# Patient Record
Sex: FEMALE | Race: WHITE | NOT HISPANIC OR LATINO | Employment: OTHER | URBAN - METROPOLITAN AREA
[De-identification: names, ages, dates, MRNs, and addresses within clinical notes are randomized per-mention and may not be internally consistent; named-entity substitution may affect disease eponyms.]

---

## 2023-10-23 ENCOUNTER — HOSPITAL ENCOUNTER (OUTPATIENT)
Dept: CARDIOLOGY | Facility: HOSPITAL | Age: 73
Discharge: HOME | End: 2023-10-23
Payer: MEDICARE

## 2023-10-23 ENCOUNTER — DOCUMENTATION (OUTPATIENT)
Dept: CARDIOLOGY | Facility: CLINIC | Age: 73
End: 2023-10-23
Payer: MEDICARE

## 2023-10-23 DIAGNOSIS — Z95.0 PACEMAKER: ICD-10-CM

## 2023-10-23 DIAGNOSIS — I44.2 ATRIOVENTRICULAR BLOCK, THIRD DEGREE (MULTI): ICD-10-CM

## 2023-10-23 DIAGNOSIS — Z95.0 PACEMAKER: Primary | ICD-10-CM

## 2023-10-24 ENCOUNTER — TELEPHONE (OUTPATIENT)
Dept: CARDIOLOGY | Facility: CLINIC | Age: 73
End: 2023-10-24
Payer: MEDICARE

## 2023-10-24 NOTE — TELEPHONE ENCOUNTER
Left message for patient to call and make a appointment ASAP with Michell due to ABN device check.

## 2023-10-24 NOTE — PROGRESS NOTES
Pacemaker interrogation dated October 23, 2023 reveals atrial fibrillation ongoing since October 21, 2023.  The patient is not presently anticoagulated.  Request forwarded to scheduling to arrange an office visit as soon as possible for discussion regarding anticoagulation therapy and treatment of atrial fibrillation.

## 2023-10-30 ENCOUNTER — HOSPITAL ENCOUNTER (OUTPATIENT)
Dept: CARDIOLOGY | Facility: HOSPITAL | Age: 73
Discharge: HOME | End: 2023-10-30
Payer: MEDICARE

## 2023-10-30 DIAGNOSIS — Z95.0 PACEMAKER: ICD-10-CM

## 2023-10-30 DIAGNOSIS — I44.2 ATRIOVENTRICULAR BLOCK, THIRD DEGREE (MULTI): ICD-10-CM

## 2023-11-10 ENCOUNTER — HOSPITAL ENCOUNTER (OUTPATIENT)
Dept: CARDIOLOGY | Facility: HOSPITAL | Age: 73
Discharge: HOME | End: 2023-11-10
Payer: MEDICARE

## 2023-11-10 ENCOUNTER — OFFICE VISIT (OUTPATIENT)
Dept: CARDIOLOGY | Facility: CLINIC | Age: 73
End: 2023-11-10
Payer: MEDICARE

## 2023-11-10 VITALS
WEIGHT: 169 LBS | HEIGHT: 63 IN | SYSTOLIC BLOOD PRESSURE: 104 MMHG | BODY MASS INDEX: 29.95 KG/M2 | HEART RATE: 71 BPM | DIASTOLIC BLOOD PRESSURE: 62 MMHG

## 2023-11-10 DIAGNOSIS — I49.5 SINUS NODE DYSFUNCTION (MULTI): ICD-10-CM

## 2023-11-10 DIAGNOSIS — R55 NEAR SYNCOPE: ICD-10-CM

## 2023-11-10 DIAGNOSIS — Z95.0 PACEMAKER: ICD-10-CM

## 2023-11-10 DIAGNOSIS — I95.1 ORTHOSTATIC HYPOTENSION: ICD-10-CM

## 2023-11-10 DIAGNOSIS — I44.39 HIGH-GRADE ATRIOVENTRICULAR BLOCK: ICD-10-CM

## 2023-11-10 DIAGNOSIS — I44.2 ATRIOVENTRICULAR BLOCK, THIRD DEGREE (MULTI): ICD-10-CM

## 2023-11-10 DIAGNOSIS — I45.89 CHRONOTROPIC INCOMPETENCE: ICD-10-CM

## 2023-11-10 DIAGNOSIS — I44.2 ATRIOVENTRICULAR BLOCK, COMPLETE (MULTI): ICD-10-CM

## 2023-11-10 DIAGNOSIS — Z71.89 ENCOUNTER FOR MEDICATION REVIEW AND COUNSELING: ICD-10-CM

## 2023-11-10 DIAGNOSIS — I48.0 PAROXYSMAL A-FIB (MULTI): Primary | ICD-10-CM

## 2023-11-10 DIAGNOSIS — Z71.2 ENCOUNTER TO DISCUSS TEST RESULTS: ICD-10-CM

## 2023-11-10 DIAGNOSIS — Z78.9 NEVER SMOKED ANY SUBSTANCE: ICD-10-CM

## 2023-11-10 DIAGNOSIS — E66.3 OVERWEIGHT WITH BODY MASS INDEX (BMI) OF 29 TO 29.9 IN ADULT: ICD-10-CM

## 2023-11-10 PROBLEM — E66.09 OTHER OBESITY DUE TO EXCESS CALORIES: Status: ACTIVE | Noted: 2023-06-14

## 2023-11-10 PROBLEM — I44.30 AV BLOCK: Status: ACTIVE | Noted: 2023-11-10

## 2023-11-10 PROBLEM — M47.812 PRIMARY OSTEOARTHRITIS OF CERVICAL SPINE: Status: ACTIVE | Noted: 2023-06-14

## 2023-11-10 PROBLEM — G47.33 OBSTRUCTIVE SLEEP APNEA (ADULT) (PEDIATRIC): Status: ACTIVE | Noted: 2023-06-14

## 2023-11-10 PROBLEM — N31.8 LOW COMPLIANCE BLADDER: Status: ACTIVE | Noted: 2023-06-14

## 2023-11-10 PROBLEM — F22: Status: ACTIVE | Noted: 2023-06-14

## 2023-11-10 PROBLEM — E13.319: Status: ACTIVE | Noted: 2023-06-14

## 2023-11-10 PROBLEM — R19.4 CHANGE IN BOWEL HABITS: Status: ACTIVE | Noted: 2023-06-14

## 2023-11-10 PROBLEM — I10 HYPERTENSIVE DISORDER: Status: ACTIVE | Noted: 2023-11-10

## 2023-11-10 PROBLEM — E78.5 HYPERLIPIDEMIA: Status: ACTIVE | Noted: 2023-06-14

## 2023-11-10 PROBLEM — R13.10 DYSPHAGIA: Status: ACTIVE | Noted: 2023-06-14

## 2023-11-10 PROBLEM — D50.8 IRON DEFICIENCY ANEMIA SECONDARY TO INADEQUATE DIETARY IRON INTAKE: Status: ACTIVE | Noted: 2023-06-14

## 2023-11-10 PROBLEM — I87.2 VENOUS INSUFFICIENCY OF BOTH LOWER EXTREMITIES: Status: ACTIVE | Noted: 2023-06-14

## 2023-11-10 PROBLEM — F43.10 PTSD (POST-TRAUMATIC STRESS DISORDER): Status: ACTIVE | Noted: 2023-06-14

## 2023-11-10 PROBLEM — Z99.89 DEPENDENCE ON OTHER ENABLING MACHINES AND DEVICES: Status: ACTIVE | Noted: 2023-06-14

## 2023-11-10 PROBLEM — M79.7 FIBROMYALGIA MUSCLE PAIN: Status: ACTIVE | Noted: 2023-06-14

## 2023-11-10 PROBLEM — M81.0 AGE-RELATED OSTEOPOROSIS WITHOUT CURRENT PATHOLOGICAL FRACTURE: Status: ACTIVE | Noted: 2023-06-14

## 2023-11-10 PROBLEM — R07.9 CHEST PAIN: Status: ACTIVE | Noted: 2023-11-10

## 2023-11-10 PROBLEM — M17.0 LOCALIZED OSTEOARTHRITIS OF KNEES, BILATERAL: Status: ACTIVE | Noted: 2023-06-14

## 2023-11-10 PROBLEM — E77.8 HYPOPROTEINEMIA (MULTI): Status: ACTIVE | Noted: 2023-06-14

## 2023-11-10 PROBLEM — N39.46 MIXED STRESS AND URGE URINARY INCONTINENCE: Status: ACTIVE | Noted: 2023-06-14

## 2023-11-10 PROBLEM — M50.90 CERVICAL DISC DISEASE: Status: ACTIVE | Noted: 2023-06-14

## 2023-11-10 PROBLEM — M81.0 OSTEOPOROSIS: Status: ACTIVE | Noted: 2023-06-14

## 2023-11-10 PROBLEM — R06.00 DYSPNEA: Status: ACTIVE | Noted: 2023-11-10

## 2023-11-10 PROBLEM — R42 DIZZINESS: Status: ACTIVE | Noted: 2023-11-10

## 2023-11-10 PROBLEM — I13.0 HYPERTENSIVE HEART AND CHRONIC KIDNEY DISEASE WITH HEART FAILURE AND STAGE 1 THROUGH STAGE 4 CHRONIC KIDNEY DISEASE, OR UNSPECIFIED CHRONIC KIDNEY DISEASE (MULTI): Status: ACTIVE | Noted: 2023-06-14

## 2023-11-10 PROBLEM — E55.9 VITAMIN D DEFICIENCY: Status: ACTIVE | Noted: 2023-06-14

## 2023-11-10 PROBLEM — I50.30 DIASTOLIC HEART FAILURE (MULTI): Status: ACTIVE | Noted: 2023-06-14

## 2023-11-10 PROBLEM — K58.1 IRRITABLE BOWEL SYNDROME WITH CONSTIPATION: Status: ACTIVE | Noted: 2023-06-14

## 2023-11-10 PROBLEM — E11.9 TYPE 2 DIABETES MELLITUS (MULTI): Status: ACTIVE | Noted: 2018-06-15

## 2023-11-10 PROBLEM — N20.0 RENAL CALCULI: Status: ACTIVE | Noted: 2023-06-14

## 2023-11-10 PROBLEM — M17.11 LOCALIZED PRIMARY OSTEOARTHRITIS OF RIGHT LOWER LEG: Status: ACTIVE | Noted: 2023-06-14

## 2023-11-10 PROBLEM — R89.9 ABNORMAL LABORATORY TEST RESULT: Status: ACTIVE | Noted: 2023-06-14

## 2023-11-10 PROBLEM — G43.009 MIGRAINE WITHOUT AURA: Status: ACTIVE | Noted: 2023-06-14

## 2023-11-10 PROBLEM — F32.A DEPRESSION: Status: ACTIVE | Noted: 2023-11-10

## 2023-11-10 PROBLEM — M17.9 OSTEOARTHRITIS OF KNEE: Status: ACTIVE | Noted: 2018-06-15

## 2023-11-10 PROBLEM — I10 BENIGN ESSENTIAL HYPERTENSION: Status: ACTIVE | Noted: 2023-06-14

## 2023-11-10 PROBLEM — N18.31 STAGE 3A CHRONIC KIDNEY DISEASE (MULTI): Status: ACTIVE | Noted: 2023-06-14

## 2023-11-10 PROBLEM — R09.02 HYPOXIA: Status: ACTIVE | Noted: 2023-06-14

## 2023-11-10 PROBLEM — N32.81 OAB (OVERACTIVE BLADDER): Status: ACTIVE | Noted: 2023-06-14

## 2023-11-10 PROBLEM — H81.10 BPPV (BENIGN PAROXYSMAL POSITIONAL VERTIGO): Status: ACTIVE | Noted: 2023-06-14

## 2023-11-10 PROBLEM — R91.1 LUNG NODULE: Status: ACTIVE | Noted: 2023-06-14

## 2023-11-10 PROCEDURE — 93290 INTERROG DEV EVAL ICPMS IP: CPT

## 2023-11-10 PROCEDURE — 93290 INTERROG DEV EVAL ICPMS IP: CPT | Performed by: INTERNAL MEDICINE

## 2023-11-10 PROCEDURE — 93000 ELECTROCARDIOGRAM COMPLETE: CPT | Performed by: INTERNAL MEDICINE

## 2023-11-10 PROCEDURE — 1159F MED LIST DOCD IN RCRD: CPT | Performed by: INTERNAL MEDICINE

## 2023-11-10 PROCEDURE — 93280 PM DEVICE PROGR EVAL DUAL: CPT | Performed by: INTERNAL MEDICINE

## 2023-11-10 PROCEDURE — 3074F SYST BP LT 130 MM HG: CPT | Performed by: INTERNAL MEDICINE

## 2023-11-10 PROCEDURE — 3008F BODY MASS INDEX DOCD: CPT | Performed by: INTERNAL MEDICINE

## 2023-11-10 PROCEDURE — 3078F DIAST BP <80 MM HG: CPT | Performed by: INTERNAL MEDICINE

## 2023-11-10 PROCEDURE — 99215 OFFICE O/P EST HI 40 MIN: CPT | Performed by: INTERNAL MEDICINE

## 2023-11-10 RX ORDER — OXYBUTYNIN CHLORIDE 5 MG/1
5 TABLET ORAL 2 TIMES DAILY
COMMUNITY
End: 2024-05-09 | Stop reason: WASHOUT

## 2023-11-10 RX ORDER — MECLIZINE HYDROCHLORIDE 25 MG/1
25 TABLET ORAL EVERY 6 HOURS PRN
COMMUNITY
End: 2023-11-10 | Stop reason: ALTCHOICE

## 2023-11-10 RX ORDER — TRIAMCINOLONE ACETONIDE 1 MG/G
1 CREAM TOPICAL AS NEEDED
COMMUNITY
End: 2023-11-10 | Stop reason: ALTCHOICE

## 2023-11-10 RX ORDER — GLUCOSAMINE/CHONDRO SU A 500-400 MG
1 TABLET ORAL
COMMUNITY
End: 2023-11-10 | Stop reason: ALTCHOICE

## 2023-11-10 RX ORDER — POTASSIUM CHLORIDE 750 MG/1
10 TABLET, FILM COATED, EXTENDED RELEASE ORAL 2 TIMES DAILY
COMMUNITY

## 2023-11-10 RX ORDER — RISPERIDONE 0.5 MG/1
1 TABLET ORAL NIGHTLY
COMMUNITY
Start: 2023-09-25

## 2023-11-10 RX ORDER — HYDROGEN PEROXIDE 3 %
20 SOLUTION, NON-ORAL MISCELLANEOUS DAILY
COMMUNITY
End: 2024-05-10 | Stop reason: WASHOUT

## 2023-11-10 RX ORDER — ALBUTEROL SULFATE 0.83 MG/ML
2.5 SOLUTION RESPIRATORY (INHALATION) EVERY 4 HOURS PRN
COMMUNITY
End: 2023-11-10 | Stop reason: ALTCHOICE

## 2023-11-10 RX ORDER — MIRTAZAPINE 15 MG/1
15 TABLET, FILM COATED ORAL NIGHTLY
COMMUNITY
End: 2023-11-10 | Stop reason: ALTCHOICE

## 2023-11-10 RX ORDER — TRAMADOL HYDROCHLORIDE 50 MG/1
50 TABLET ORAL EVERY 6 HOURS PRN
COMMUNITY
Start: 2023-02-08 | End: 2023-11-10 | Stop reason: ALTCHOICE

## 2023-11-10 RX ORDER — FLUOCINONIDE TOPICAL SOLUTION USP, 0.05% 0.5 MG/ML
SOLUTION TOPICAL
COMMUNITY
End: 2023-11-10 | Stop reason: ALTCHOICE

## 2023-11-10 RX ORDER — POTASSIUM CHLORIDE 750 MG/1
10 TABLET, FILM COATED, EXTENDED RELEASE ORAL 2 TIMES DAILY
COMMUNITY
Start: 2023-06-14 | End: 2023-11-10 | Stop reason: ALTCHOICE

## 2023-11-10 RX ORDER — INSULIN LISPRO 100 [IU]/ML
6 INJECTION, SOLUTION INTRAVENOUS; SUBCUTANEOUS
COMMUNITY
End: 2023-11-10 | Stop reason: ALTCHOICE

## 2023-11-10 RX ORDER — INSULIN GLARGINE 100 [IU]/ML
22 INJECTION, SOLUTION SUBCUTANEOUS EVERY MORNING
COMMUNITY
Start: 2023-10-05

## 2023-11-10 RX ORDER — ATORVASTATIN CALCIUM 20 MG/1
20 TABLET, FILM COATED ORAL NIGHTLY
COMMUNITY
Start: 2022-05-31

## 2023-11-10 RX ORDER — PREDNISONE 20 MG/1
TABLET ORAL
COMMUNITY
End: 2024-05-09 | Stop reason: WASHOUT

## 2023-11-10 RX ORDER — AMOXICILLIN 250 MG
1 CAPSULE ORAL NIGHTLY
COMMUNITY
End: 2023-11-10 | Stop reason: ALTCHOICE

## 2023-11-10 RX ORDER — PEN NEEDLE, DIABETIC 29 G X1/2"
NEEDLE, DISPOSABLE MISCELLANEOUS EVERY 8 HOURS
COMMUNITY

## 2023-11-10 RX ORDER — CITALOPRAM 40 MG/1
40 TABLET, FILM COATED ORAL DAILY
COMMUNITY
End: 2023-11-10 | Stop reason: ALTCHOICE

## 2023-11-10 RX ORDER — SEMAGLUTIDE 1.34 MG/ML
0.25 INJECTION, SOLUTION SUBCUTANEOUS
COMMUNITY
Start: 2023-08-22 | End: 2023-11-10 | Stop reason: ALTCHOICE

## 2023-11-10 RX ORDER — POTASSIUM CHLORIDE 20 MEQ/1
20 TABLET, EXTENDED RELEASE ORAL 2 TIMES DAILY
COMMUNITY
Start: 2023-06-13 | End: 2023-11-10 | Stop reason: ALTCHOICE

## 2023-11-10 RX ORDER — ASPIRIN 81 MG/1
81 TABLET ORAL EVERY OTHER DAY
COMMUNITY
End: 2024-05-10 | Stop reason: WASHOUT

## 2023-11-10 RX ORDER — ALPRAZOLAM 0.25 MG/1
TABLET ORAL
COMMUNITY
End: 2023-11-10 | Stop reason: ALTCHOICE

## 2023-11-10 RX ORDER — KETOCONAZOLE 20 MG/ML
SHAMPOO, SUSPENSION TOPICAL
COMMUNITY
End: 2023-11-10 | Stop reason: ALTCHOICE

## 2023-11-10 RX ORDER — HYDROGEN PEROXIDE 3 %
20 SOLUTION, NON-ORAL MISCELLANEOUS 2 TIMES DAILY
COMMUNITY
End: 2023-11-10 | Stop reason: ALTCHOICE

## 2023-11-10 RX ORDER — SULFAMETHOXAZOLE AND TRIMETHOPRIM 800; 160 MG/1; MG/1
1 TABLET ORAL EVERY 12 HOURS
COMMUNITY
End: 2023-11-10 | Stop reason: ALTCHOICE

## 2023-11-10 RX ORDER — SOLIFENACIN SUCCINATE 10 MG/1
1 TABLET, FILM COATED ORAL DAILY
COMMUNITY
Start: 2023-07-25 | End: 2023-11-10 | Stop reason: ALTCHOICE

## 2023-11-10 RX ORDER — ORAL SEMAGLUTIDE 7 MG/1
1 TABLET ORAL DAILY
COMMUNITY
End: 2023-11-10 | Stop reason: ALTCHOICE

## 2023-11-10 RX ORDER — PEN NEEDLE, DIABETIC 30 GX3/16"
1 NEEDLE, DISPOSABLE MISCELLANEOUS 4 TIMES DAILY PRN
COMMUNITY
Start: 2023-05-22

## 2023-11-10 RX ORDER — RISPERIDONE 1 MG/1
1 TABLET ORAL
COMMUNITY
End: 2023-11-10 | Stop reason: ALTCHOICE

## 2023-11-10 RX ORDER — METOPROLOL TARTRATE 50 MG/1
50 TABLET ORAL 2 TIMES DAILY
COMMUNITY
End: 2023-11-10 | Stop reason: ALTCHOICE

## 2023-11-10 RX ORDER — CALCIUM CARBONATE/VITAMIN D3 500 MG-10
1 TABLET,CHEWABLE ORAL
COMMUNITY
End: 2023-11-10 | Stop reason: ALTCHOICE

## 2023-11-10 RX ORDER — AMLODIPINE BESYLATE 5 MG/1
5 TABLET ORAL DAILY
COMMUNITY
End: 2023-11-10 | Stop reason: ALTCHOICE

## 2023-11-10 RX ORDER — POLYETHYLENE GLYCOL 3350 17 G/17G
17 POWDER, FOR SOLUTION ORAL
COMMUNITY
End: 2023-11-10 | Stop reason: ALTCHOICE

## 2023-11-10 RX ORDER — OXYCODONE AND ACETAMINOPHEN 5; 325 MG/1; MG/1
1 TABLET ORAL EVERY 6 HOURS
COMMUNITY
End: 2023-11-10 | Stop reason: ALTCHOICE

## 2023-11-10 RX ORDER — HYDROXYZINE HYDROCHLORIDE 25 MG/1
TABLET, FILM COATED ORAL
COMMUNITY
End: 2023-11-10 | Stop reason: ALTCHOICE

## 2023-11-10 RX ORDER — FLUOCINOLONE ACETONIDE 0.1 MG/G
1 CREAM TOPICAL 2 TIMES DAILY
COMMUNITY
End: 2023-11-10 | Stop reason: ALTCHOICE

## 2023-11-10 RX ORDER — PRAZOSIN HYDROCHLORIDE 1 MG/1
1 CAPSULE ORAL NIGHTLY
COMMUNITY
End: 2024-05-10 | Stop reason: WASHOUT

## 2023-11-10 RX ORDER — FLUTICASONE PROPIONATE AND SALMETEROL 100; 50 UG/1; UG/1
1 POWDER RESPIRATORY (INHALATION) 2 TIMES DAILY
COMMUNITY
Start: 2023-08-03 | End: 2023-11-10 | Stop reason: ALTCHOICE

## 2023-11-10 RX ORDER — ACETAMINOPHEN 500 MG
2000 TABLET ORAL
COMMUNITY
End: 2023-11-10 | Stop reason: ALTCHOICE

## 2023-11-10 RX ORDER — FLUTICASONE FUROATE AND VILANTEROL 100; 25 UG/1; UG/1
1 POWDER RESPIRATORY (INHALATION) DAILY
COMMUNITY
End: 2023-11-10 | Stop reason: ALTCHOICE

## 2023-11-10 RX ORDER — FUROSEMIDE 20 MG/1
20 TABLET ORAL DAILY PRN
COMMUNITY

## 2023-11-10 RX ORDER — ORAL SEMAGLUTIDE 14 MG/1
14 TABLET ORAL DAILY
COMMUNITY

## 2023-11-10 RX ORDER — HYDROCODONE BITARTRATE AND ACETAMINOPHEN 5; 325 MG/1; MG/1
1 TABLET ORAL AS NEEDED
COMMUNITY
Start: 2018-10-19 | End: 2023-11-10 | Stop reason: ALTCHOICE

## 2023-11-10 RX ORDER — CYCLOBENZAPRINE HCL 5 MG
10 TABLET ORAL 2 TIMES DAILY
COMMUNITY
End: 2023-11-10 | Stop reason: ALTCHOICE

## 2023-11-10 RX ORDER — AMLODIPINE BESYLATE 2.5 MG/1
2.5 TABLET ORAL EVERY MORNING
Qty: 90 TABLET | Refills: 3 | Status: SHIPPED | OUTPATIENT
Start: 2023-11-10

## 2023-11-10 RX ORDER — GABAPENTIN 100 MG/1
100 CAPSULE ORAL 3 TIMES DAILY
COMMUNITY
End: 2023-11-10 | Stop reason: ALTCHOICE

## 2023-11-10 RX ORDER — INSULIN GLARGINE 100 [IU]/ML
30 INJECTION, SOLUTION SUBCUTANEOUS NIGHTLY
COMMUNITY
End: 2023-11-10 | Stop reason: ALTCHOICE

## 2023-11-10 RX ORDER — FUROSEMIDE 40 MG/1
40 TABLET ORAL DAILY PRN
COMMUNITY
End: 2023-11-10 | Stop reason: ALTCHOICE

## 2023-11-10 RX ORDER — ACETAMINOPHEN 500 MG
1 TABLET ORAL DAILY
COMMUNITY
End: 2023-11-10 | Stop reason: ALTCHOICE

## 2023-11-10 RX ORDER — LANOLIN ALCOHOL/MO/W.PET/CERES
1 CREAM (GRAM) TOPICAL DAILY
COMMUNITY

## 2023-11-10 RX ORDER — INSULIN LISPRO 100 [IU]/ML
10 INJECTION, SOLUTION INTRAVENOUS; SUBCUTANEOUS SEE ADMIN INSTRUCTIONS
COMMUNITY

## 2023-11-10 RX ORDER — DENOSUMAB 60 MG/ML
60 INJECTION SUBCUTANEOUS
COMMUNITY
End: 2023-11-10 | Stop reason: ALTCHOICE

## 2023-11-10 RX ORDER — CITALOPRAM 20 MG/1
20 TABLET, FILM COATED ORAL EVERY MORNING
COMMUNITY
Start: 2023-10-06

## 2023-11-10 RX ORDER — TAMSULOSIN HYDROCHLORIDE 0.4 MG/1
CAPSULE ORAL
COMMUNITY
End: 2023-11-10 | Stop reason: ALTCHOICE

## 2023-11-10 RX ORDER — ACETAMINOPHEN 500 MG
1000 TABLET ORAL EVERY 6 HOURS PRN
COMMUNITY
End: 2023-11-10 | Stop reason: ALTCHOICE

## 2023-11-10 RX ORDER — ALBUTEROL SULFATE 90 UG/1
1 AEROSOL, METERED RESPIRATORY (INHALATION) EVERY 4 HOURS PRN
COMMUNITY
Start: 2023-07-13

## 2023-11-10 RX ORDER — AMLODIPINE BESYLATE 2.5 MG/1
2.5 TABLET ORAL EVERY MORNING
COMMUNITY
Start: 2023-05-09 | End: 2023-11-10 | Stop reason: SDUPTHER

## 2023-11-10 RX ORDER — CEPHALEXIN 500 MG/1
1000 CAPSULE ORAL 2 TIMES DAILY
COMMUNITY
End: 2023-11-10 | Stop reason: ALTCHOICE

## 2023-11-10 NOTE — PROGRESS NOTES
"Chief Complaint:   sinus node dysfunction     History Of Present Illness:    Twyla Waller is a 73 y.o. female presenting with follow up.    She denies any arrhythmia symptoms.  She denies any lightheadedness, near-syncope or syncope.      She denies any palpitation . She did not know she had any atrial fibrillation.     Last Recorded Vitals:  Vitals:    11/10/23 1258   BP: 104/62   Pulse: 71   Weight: 76.7 kg (169 lb)   Height: 1.6 m (5' 3\")       Past Medical History:  She has no past medical history on file.    Past Surgical History:  She has no past surgical history on file.      Social History:  She reports that she has never smoked. She has never used smokeless tobacco. She reports that she does not currently use alcohol. She reports that she does not currently use drugs.    Family History:  No family history on file.     Allergies:  Morphine, Calcium channel blocking agents-dihydropyridines, Latex, Penicillins, Ace inhibitors, Alendronate, Calcium channel blocking agent diltiazem analogues, Ibuprofen, Lisinopril, Metformin, Rybelsus [semaglutide], Sertraline, and Iodine    Outpatient Medications:  Current Outpatient Medications   Medication Instructions    albuterol 90 mcg/actuation inhaler 1 puff, inhalation, Every 4 hours PRN    amLODIPine (NORVASC) 2.5 mg, oral, Every morning    aspirin 81 mg, oral, Every morning    atorvastatin (LIPITOR) 20 mg, oral, Nightly    citalopram (CELEXA) 20 mg, oral, Every morning    cyanocobalamin (Vitamin B-12) 1,000 mcg tablet 1 tablet, oral, Daily    esomeprazole (NEXIUM) 20 mg, oral, Daily    furosemide (LASIX) 20 mg, oral, Every morning    insulin lispro (HumaLOG) 100 unit/mL injection INJECT 8 UNITS WITH BREAKFAST, 6 UNITS WITH LUNCH - PLUS CORRECTION 1:30>150MG/DL (MAX DAILY AMOUNT OF 40 UNITS) for 112<BR>    Lantus Solostar U-100 Insulin 16 Units, subcutaneous, Nightly    oxybutynin (DITROPAN) 5 mg, oral, 2 times daily    pen needle 1/2\" 29G X 12mm needle Every 8 hours " "   pen needle, diabetic (BD Ultra-Fine Short Pen Needle) 31 gauge x 5/16\" needle 1 each, subcutaneous, 4 times daily PRN    potassium chloride CR 10 mEq ER tablet 20 mEq, oral, 2 times daily    prazosin (MINIPRESS) 1 mg, oral, Nightly    predniSONE (Deltasone) 20 mg tablet     risperiDONE (RisperDAL) 0.5 mg tablet 1 tablet, oral, Nightly    Rybelsus 14 mg, oral, Daily, 30 MINUTES BEFORE FIRST FOOD, BEVERAGE, OR OTHER MEDICATION OF THE DAY<BR>   ROS  Constitutional: not feeling tired.   Eyes: no eyesight problems.   ENT: no hearing loss and no nosebleeds.   Cardiovascular: no intermittent leg claudication and as noted in HPI.   Respiratory: no chronic cough and no shortness of breath.   Gastrointestinal: no change in bowel habits and no blood in stools.   Genitourinary: no urinary frequency.   Skin: no skin rashes.   Neurological: no seizures and no frequent falls.   Psychiatric: no depression and not suicidal.   All other systems have been reviewed and are negative for complaint.   Physical Exam:  Constitutional: alert and in no acute distress.   Neck: neck is supple, symmetric, trachea midline, no masses  and no thyromegaly .   Pulmonary: no increased work of breathing or signs of respiratory distress  and lungs clear to auscultation.    Cardiovascular: carotid pulses 2+ bilaterally with no bruit , JVP was normal, no thrills , regular rhythm, normal S1 and S2, no murmurs  occasional irregular beat., pedal pulses 2+ bilaterally  and no edema  bilateral ankle 1+ pitting edema.   Abdomen: abdomen non-tender, no masses  and no hepatomegaly .   Skin: skin warm and dry, normal skin turgor .   Psychiatric judgment and insight is normal  and oriented to person, place and time .      Last Labs:  CBC -  Lab Results   Component Value Date    WBC 10.0 02/08/2023    HGB 13.5 02/08/2023    HCT 41.0 02/08/2023    MCV 96 02/08/2023     02/08/2023       CMP -  Lab Results   Component Value Date    CALCIUM 9.8 02/08/2023 " "      LIPID PANEL -   No results found for: \"CHOL\", \"TRIG\", \"HDL\", \"CHHDL\", \"LDLF\", \"VLDL\", \"NHDL\"    RENAL FUNCTION PANEL -   Lab Results   Component Value Date    GLUCOSE 152 (H) 02/08/2023     02/08/2023    K 3.2 (L) 02/08/2023     02/08/2023    CO2 31 02/08/2023    ANIONGAP 12 02/08/2023    BUN 31 (H) 02/08/2023    CREATININE 1.12 (H) 02/08/2023    CALCIUM 9.8 02/08/2023        No results found for: \"BNP\", \"HGBA1C\"    Last Cardiology Tests:        48 hour monitor Nov 2022. Sinus pause 3 seconds. Intermittent high grade heart block.  Echo LVEF 65% Jan . 2020    ECG:  Today.    Atrial fibrillation. Appropriate pacing.  Left axis deviation.  Corrected QT interval 490 ms  ECG May 2023 Appropriate pacing and sensing.      Lab review: I have personally reviewed the laboratory result(s) see above    Assessment/Plan   Diagnoses and all orders for this visit:  Pacemaker  Sinus node dysfunction (CMS/HCC)  High-grade atrioventricular block  Chronotropic incompetence  Orthostatic hypotension  Near syncope  Encounter for medication review and counseling  Encounter to discuss test results  Overweight with body mass index (BMI) of 29 to 29.9 in adult  Never smoked any substance      Syncope and intermittent high grade AV block and sinus node dysfunction with pauses of 3 seconds. Intermittent complete heart block. Status post dual-chamber pacemaker March 2023  Sinus node dysfunction.  Chronotropic incompetence.  Walk test performed in past.  Adjusted sensor.  [employer] PM 2272 pacemaker. Reviewed device check with patient and . All questions answered. Ordered device checks.  Dizziness and chronotropic incompetence.  Improved after adjustment of sensor.    Paroxysmal atrial fibrillation.  Reviewed etiologies of atrial fibrillation.  Associated medical conditions discussed. Treatment options reviewed. Discussed anticoagulation vs no anticoagulation. Start Eliquis. Prescription sent.   Orthostatic hypotension. " Improved with changing med. Behavioral modification reenforced  Overweight  AHA recommendations for exercise, diet, and behavioral modification reviewed with pt.    The patient and I and  discussed the mechanism of arrhythmia, pacemaker, pacemaker function, pacemaker model, heart rate, walk test, indications for and types of medications, discussion if and what medication refills needed, treatment options, risks, benefits, and imponderables. American Heart Association lifestyle changes and behavioral modification discussed. All questions answered in detail. Counseling over 50% visit regarding above. Patient appreciative of care.         Depression screen    Normal depression screening noted and reviewed with patient. Referred to primary physician for follow- up.          Grammar    Please excuse grammatical or dictation errors as software dictation application being used.        Ofelia Camargo MD

## 2023-11-10 NOTE — PATIENT INSTRUCTIONS
START ELIQUIS 5 MG TWICE DAILY    FOLLOW UP IN 6 MONTHS WITH FORREST WITH A DEVICE CHECK  REMOTE CHECKS IN 3 AND 9 MONTHS

## 2023-11-13 ENCOUNTER — HOSPITAL ENCOUNTER (OUTPATIENT)
Dept: CARDIOLOGY | Facility: HOSPITAL | Age: 73
Discharge: HOME | End: 2023-11-13
Payer: MEDICARE

## 2023-11-13 DIAGNOSIS — I44.2 ATRIOVENTRICULAR BLOCK, THIRD DEGREE (MULTI): ICD-10-CM

## 2023-11-13 DIAGNOSIS — Z95.0 PACEMAKER: ICD-10-CM

## 2024-02-13 ENCOUNTER — HOSPITAL ENCOUNTER (OUTPATIENT)
Dept: CARDIOLOGY | Facility: HOSPITAL | Age: 74
Discharge: HOME | End: 2024-02-13
Payer: MEDICARE

## 2024-02-13 DIAGNOSIS — Z95.0 PACEMAKER: ICD-10-CM

## 2024-02-13 DIAGNOSIS — I44.2 ATRIOVENTRICULAR BLOCK, THIRD DEGREE (MULTI): ICD-10-CM

## 2024-02-13 PROCEDURE — 93294 REM INTERROG EVL PM/LDLS PM: CPT | Performed by: INTERNAL MEDICINE

## 2024-02-13 PROCEDURE — 93296 REM INTERROG EVL PM/IDS: CPT

## 2024-03-27 ENCOUNTER — TELEPHONE (OUTPATIENT)
Dept: CARDIOLOGY | Facility: CLINIC | Age: 74
End: 2024-03-27
Payer: MEDICARE

## 2024-03-27 DIAGNOSIS — I48.0 PAROXYSMAL A-FIB (MULTI): Primary | ICD-10-CM

## 2024-03-27 NOTE — TELEPHONE ENCOUNTER
Pt left message stating that she is scheduled for dental work next week.  Pt is asking to speak with someone from Dr. Camargo's office.    Attempted to contact pt, left message for pt to return call to office.    Routed to Indigo SOLIS RN

## 2024-03-28 NOTE — TELEPHONE ENCOUNTER
Pt left message that she needs direction on holding Eliquis prior to dental work.      Return call placed to pt.  Pt is having a dental cleaning, filling and cap to be done.  Pt needs to hold Eliquis and aspirin prior to dental work on Wednesday 4/3/2024.    Pt states that the dentist is Florecita plunkett in [facility].  Phone is [phone].      Routed to Starr ROSALES LPN

## 2024-03-29 RX ORDER — ENOXAPARIN SODIUM 100 MG/ML
INJECTION SUBCUTANEOUS
Qty: 3 EACH | Refills: 0 | Status: SHIPPED | OUTPATIENT
Start: 2024-03-29 | End: 2024-04-03 | Stop reason: WASHOUT

## 2024-03-29 NOTE — TELEPHONE ENCOUNTER
I called patient and advised. I instructed her to start holding Aspirin now and hold Eliquis for 3 days prior. She verbalized understanding. Will send Lovenox to [facility] in [facility] per patient request.

## 2024-04-01 NOTE — TELEPHONE ENCOUNTER
Starr  from [facility] left message asking for return call to discuss pt's upcoming dental appointments.  Per Starr pt will be having 2 apt's per week for the next month.  Starr states that pt was advised by [facility] that she would need injections.  Starr is asking how pt will do the injections with the appointments.  Starr can be reached at [phone] option 2      Routed to Indigo SOLIS RN

## 2024-04-02 NOTE — TELEPHONE ENCOUNTER
Pt left message in regards to this as well.  Please call pt with final recommendations.    Routed to Indigo SOLIS RN

## 2024-04-03 RX ORDER — ENOXAPARIN SODIUM 100 MG/ML
100 INJECTION SUBCUTANEOUS DAILY
Qty: 30 EACH | Refills: 0 | Status: SHIPPED | OUTPATIENT
Start: 2024-04-03 | End: 2024-05-09 | Stop reason: WASHOUT

## 2024-04-03 NOTE — TELEPHONE ENCOUNTER
Per Michell CNP, we will prescribe Lovenox injections once daily while patient is holding Eliquis for multiple procedures that are happening throughout the month. Patient will hold the Lovenox the day before any scheduled procedures and resume once it is Ok'd by provider performing the procedures. Patient will resume Eliquis when all procedures are completed. Order placed and sent to provider for signature.

## 2024-04-08 NOTE — TELEPHONE ENCOUNTER
Lesly from [facility] [phone] left message stating that they need to verify dosage of Lovenox pt is to be taking.  Per Lesly, pt does not understand what they are suppose to do with holding Eliquis and taking the Lovenox.  Per Lesly they can help with the instructions if they know what days pt is having procedures done.    Lesly can be reached at [phone] listen until the end then hit 0 and then 0 again.    Routed to Indigo SOLIS RN

## 2024-05-09 ENCOUNTER — HOSPITAL ENCOUNTER (OUTPATIENT)
Dept: CARDIOLOGY | Facility: HOSPITAL | Age: 74
Discharge: HOME | End: 2024-05-09
Payer: MEDICARE

## 2024-05-09 ENCOUNTER — OFFICE VISIT (OUTPATIENT)
Dept: CARDIOLOGY | Facility: CLINIC | Age: 74
End: 2024-05-09
Payer: MEDICARE

## 2024-05-09 VITALS
HEART RATE: 73 BPM | WEIGHT: 163 LBS | SYSTOLIC BLOOD PRESSURE: 118 MMHG | DIASTOLIC BLOOD PRESSURE: 72 MMHG | HEIGHT: 63 IN | BODY MASS INDEX: 28.88 KG/M2

## 2024-05-09 DIAGNOSIS — I87.2 VENOUS INSUFFICIENCY OF BOTH LOWER EXTREMITIES: ICD-10-CM

## 2024-05-09 DIAGNOSIS — I13.0 HYPERTENSIVE HEART AND CHRONIC KIDNEY DISEASE WITH HEART FAILURE AND STAGE 1 THROUGH STAGE 4 CHRONIC KIDNEY DISEASE, OR UNSPECIFIED CHRONIC KIDNEY DISEASE (MULTI): ICD-10-CM

## 2024-05-09 DIAGNOSIS — I44.39 HIGH-GRADE ATRIOVENTRICULAR BLOCK: ICD-10-CM

## 2024-05-09 DIAGNOSIS — I45.89 CHRONOTROPIC INCOMPETENCE: ICD-10-CM

## 2024-05-09 DIAGNOSIS — E78.2 MIXED HYPERLIPIDEMIA: ICD-10-CM

## 2024-05-09 DIAGNOSIS — I10 BENIGN ESSENTIAL HYPERTENSION: Primary | ICD-10-CM

## 2024-05-09 DIAGNOSIS — I50.32 CHRONIC DIASTOLIC HEART FAILURE (MULTI): ICD-10-CM

## 2024-05-09 DIAGNOSIS — R07.9 CHEST PAIN, UNSPECIFIED TYPE: ICD-10-CM

## 2024-05-09 DIAGNOSIS — I95.1 ORTHOSTATIC HYPOTENSION: ICD-10-CM

## 2024-05-09 DIAGNOSIS — I44.30 AV BLOCK: ICD-10-CM

## 2024-05-09 DIAGNOSIS — I49.5 SINUS NODE DYSFUNCTION (MULTI): ICD-10-CM

## 2024-05-09 DIAGNOSIS — Z95.0 PACEMAKER: ICD-10-CM

## 2024-05-09 PROCEDURE — 1159F MED LIST DOCD IN RCRD: CPT | Performed by: NURSE PRACTITIONER

## 2024-05-09 PROCEDURE — 93280 PM DEVICE PROGR EVAL DUAL: CPT

## 2024-05-09 PROCEDURE — 93280 PM DEVICE PROGR EVAL DUAL: CPT | Performed by: INTERNAL MEDICINE

## 2024-05-09 PROCEDURE — 3078F DIAST BP <80 MM HG: CPT | Performed by: NURSE PRACTITIONER

## 2024-05-09 PROCEDURE — 99215 OFFICE O/P EST HI 40 MIN: CPT | Performed by: NURSE PRACTITIONER

## 2024-05-09 PROCEDURE — 1160F RVW MEDS BY RX/DR IN RCRD: CPT | Performed by: NURSE PRACTITIONER

## 2024-05-09 PROCEDURE — 1036F TOBACCO NON-USER: CPT | Performed by: NURSE PRACTITIONER

## 2024-05-09 PROCEDURE — 3074F SYST BP LT 130 MM HG: CPT | Performed by: NURSE PRACTITIONER

## 2024-05-09 RX ORDER — SOLIFENACIN SUCCINATE 10 MG/1
10 TABLET, FILM COATED ORAL DAILY
COMMUNITY
Start: 2024-02-12 | End: 2024-05-10 | Stop reason: WASHOUT

## 2024-05-09 RX ORDER — AMOXICILLIN 250 MG
1 CAPSULE ORAL DAILY
COMMUNITY
Start: 2020-01-28

## 2024-05-09 RX ORDER — ALPRAZOLAM 0.25 MG/1
TABLET ORAL
COMMUNITY
End: 2024-05-10 | Stop reason: WASHOUT

## 2024-05-09 RX ORDER — ACETAMINOPHEN 500 MG
2000 TABLET ORAL
COMMUNITY

## 2024-05-09 RX ORDER — FLUOCINOLONE ACETONIDE 0.1 MG/G
CREAM TOPICAL 2 TIMES DAILY
COMMUNITY

## 2024-05-09 RX ORDER — METOPROLOL TARTRATE 50 MG/1
50 TABLET ORAL 2 TIMES DAILY
COMMUNITY
End: 2024-05-10 | Stop reason: WASHOUT

## 2024-05-09 RX ORDER — DENOSUMAB 60 MG/ML
60 INJECTION SUBCUTANEOUS
COMMUNITY

## 2024-05-09 RX ORDER — MIRTAZAPINE 15 MG/1
15 TABLET, FILM COATED ORAL NIGHTLY
COMMUNITY

## 2024-05-09 RX ORDER — FLUTICASONE PROPIONATE AND SALMETEROL 100; 50 UG/1; UG/1
1 POWDER RESPIRATORY (INHALATION)
COMMUNITY

## 2024-05-09 NOTE — H&P (VIEW-ONLY)
"CARDIOLOGY OFFICE VISIT      CHIEF COMPLAINT  Chief Complaint   Patient presents with    Device Check   Chief complaint: \"I do not understand why I am feeling so bad.\"    HISTORY OF PRESENT ILLNESS  HPI  History: The patient is a 74-year-old  female who is followed for syncope secondary to sinus node dysfunction and transient high degree AV block status post dual-chamber pacemaker implant on February 8, 2023.  At the office visit with Dr. Camargo dated November 10, 2023 the patient was noted to be in atrial fibrillation and was placed on anticoagulation therapy with Eliquis.  The medical records reveals the patient was also prescribed metoprolol tartrate 50 mg twice a day.  In review of the medications the patient's  states he does not believe she is taking this medication.  He states he is unable to verify the medication list.  The patient states that she has been forgetting to take her medications.  She states that she also underwent dental work and was prescribed Lovenox and missed several of the doses of that medicine.  She reports that she has missed multiple doses of the Eliquis \"here and there.\"  She states that she feels like she has \"brain fog.\".  She reports that she is more easily fatigued and occasionally experiences shortness of breath with activity.  She denies palpitations, chest pain, or abdominal distention.  She does experience some mild lower extremity swelling.  She is accompanied by her  for today's office visit who states that he manages her medications.    Past Medical History  History reviewed. No pertinent past medical history.    Social History  Social History     Tobacco Use    Smoking status: Never    Smokeless tobacco: Never   Substance Use Topics    Alcohol use: Not Currently    Drug use: Not Currently       Family History     Family History   Problem Relation Name Age of Onset    Colon cancer Mother      Hypertension Father      Aneurysm Father      Pancreatic " cancer Sister          Allergies:  Allergies   Allergen Reactions    Morphine Other     Mental Status Change    Calcium Channel Blocking Agents-Dihydropyridines Other     irregular heart rate    Latex Itching and Rash    Penicillins Rash    Ace Inhibitors Cough    Alendronate Unknown    Calcium Channel Blocking Agent Diltiazem Analogues Itching    Ibuprofen Unknown    Lisinopril Cough    Metformin Diarrhea    Rybelsus [Semaglutide] Diarrhea    Sertraline Unknown    Iodine Rash        Outpatient Medications:  Current Outpatient Medications   Medication Instructions    albuterol 90 mcg/actuation inhaler 1 puff, inhalation, Every 4 hours PRN    ALPRAZolam (Xanax) 0.25 mg tablet     amLODIPine (NORVASC) 2.5 mg, oral, Every morning    apixaban (ELIQUIS) 5 mg, oral, 2 times daily    aspirin 81 mg, oral, Every other day    atorvastatin (LIPITOR) 20 mg, oral, Nightly    CALCIUM 26-VIT D3-MAGNESIUM 15 ORAL oral    cholecalciferol (VITAMIN D-3) 2,000 Units, oral, Daily RT    citalopram (CELEXA) 20 mg, oral, Every morning    cyanocobalamin (Vitamin B-12) 1,000 mcg tablet 1 tablet, oral, Daily    enoxaparin (LOVENOX) 100 mg, subcutaneous, Daily, Inject 1 syringe daily while holding your Eliquis. Hold Lovenox for a full 24 hours prior to any scheduled procedures.    esomeprazole (NEXIUM) 20 mg, oral, Daily    fluocinolone 0.01 % cream Topical, 2 times daily    fluticasone propion-salmeteroL (Advair Diskus) 100-50 mcg/dose diskus inhaler 1 puff, inhalation, 2 times daily RT, Rinse mouth with water after use to reduce aftertaste and incidence of candidiasis. Do not swallow.    furosemide (LASIX) 20 mg, oral, Every morning    insulin lispro (HumaLOG) 100 unit/mL injection INJECT 8 UNITS WITH BREAKFAST, 6 UNITS WITH LUNCH - PLUS CORRECTION 1:30>150MG/DL (MAX DAILY AMOUNT OF 40 UNITS) for 112<BR>    Lantus Solostar U-100 Insulin 16 Units, subcutaneous, Nightly    linaclotide (LINZESS ORAL) oral, As needed    metoprolol tartrate  "(LOPRESSOR) 50 mg, oral, 2 times daily    mirtazapine (REMERON) 15 mg, oral, Nightly    pen needle 1/2\" 29G X 12mm needle Every 8 hours    pen needle, diabetic (BD Ultra-Fine Short Pen Needle) 31 gauge x 5/16\" needle 1 each, subcutaneous, 4 times daily PRN    polyethylene glycol (GLYCOLAX, MIRALAX) 17 g, oral, Daily RT    potassium chloride CR 10 mEq ER tablet 20 mEq, oral, 2 times daily    prazosin (MINIPRESS) 1 mg, oral, Nightly    Prolia 60 mg, subcutaneous, Every 6 months    risperiDONE (RisperDAL) 0.5 mg tablet 1 tablet, oral, Nightly    Rybelsus 14 mg, oral, Daily, 30 MINUTES BEFORE FIRST FOOD, BEVERAGE, OR OTHER MEDICATION OF THE DAY<BR>    sennosides-docusate sodium (Guadalupe-Colace) 8.6-50 mg tablet 1 tablet, oral, Daily    solifenacin (VESICARE) 10 mg, oral, Daily          REVIEW OF SYSTEMS  Review of Systems   All other systems reviewed and are negative.        VITALS  Vitals:    05/09/24 1353   BP: 118/72   Pulse: 73       PHYSICAL EXAM  Vitals and nursing note reviewed.   Constitutional:       Appearance: Normal appearance.   HENT:      Head: Normocephalic.   Neck:      Vascular: No JVD.   Cardiovascular:      Rate and Rhythm: Normal rate and regular rhythm.      Pulses: Normal pulses.      Heart sounds: Normal heart sounds.      Trace bilateral pedal edema.  Pulmonary:      Effort: Pulmonary effort is normal.      Breath sounds: Normal breath sounds.   Abdominal:      General: Bowel sounds are normal.      Palpations: Abdomen is soft.   Musculoskeletal:         General: Normal range of motion.      Cervical back: Normal range of motion.   Skin:     General: Skin is warm and dry.  Left subclavian pacemaker pocket is well-healed without redness swelling or drainage.  Neurological:      General: No focal deficit present.      Mental Status: She is alert and oriented to person, place, and time.      Motor: Motor function is intact.   Psychiatric:         Attention and Perception: Attention and perception " normal.         Mood and Affect: Mood and affect normal.         Speech: Speech normal.         Behavior: Behavior normal. Behavior is cooperative.         Thought Content: Thought content normal.         Cognition and Memory: Cognition and memory normal.     Labs and testing: Twelve-lead EKG reveals atrial fibrillation with ventricular pacing at 73 bpm.  QRS durations 146 ms,  ms, QTc 506 ms.  Pacemaker interrogation dated May 9, 2024 reveals atrial pacing 0.24% and ventricular pacing 99.65%.  No ventricular detections were noted.  The A-fib burden is 100%.  Estimated battery longevity is 8 years and 4 months.  Review of the direct trends reveals atrial fibrillation ongoing since mid October, 2023.  Patient has been ventricularly paced 100% of the time while in atrial fibrillation.  CTA of the chest dated November 29, 2022 revealed calcified mediastinal lymph nodes, moderate hiatal hernia, mild scattered scarring of the lungs with a right upper lobe calcified granuloma.  Granulomas were also noted within the liver and spleen.      ASSESSMENT AND PLAN    Clinical impressions:  1.  Syncope secondary to sinus node dysfunction and high degree AV block status post dual-chamber pacemaker implant [facility] on February 8, 2023.  2.  Persistent atrial fibrillation prescribed Eliquis and metoprolol with patient report of noncompliance with medications.  3.  Hypertension, controlled with a blood pressure today 118/72.  4.  Dyslipidemia on statin.  5.  Diabetes mellitus.  6.  Hypokalemia on potassium replacement.  7.  CTA of the chest dated November 29, 2022 revealing calcified mediastinal lymph nodes and calcified granulomas of the right upper lobe of the lung as well as liver and spleen.  8.  Overweight with a BMI of 28.87.    Recommendations:  1.  Discussed anatomy and physiology of the documented arrhythmia.  Treatment options reviewed in detail. Instructed to take all current medications as  prescribed.  Limit caffeine and alcohol consumption to two beverages per day.  Increase water intake to 64 ounces per day.  Refrain from over the counter cold medications in tablet form.  Treat symptoms:  nasal spray for nasal congestion; zinc lozenges for sore throat; plain Robitussin or Delsym for cough; increase vitamin C intake.  Exercise five or more days per week for 30 minutes per session per American Heart Association guidelines.  2.  I was unable to verify the patient's medications.  I requested the patient's  and reviewed the medications and compare the list provided to him from today's office visit with the medications at home and contact me regarding updating the medication list.  Further recommendations will be pending obtaining an accurate medication list.  I did discuss with the patient and her  possible antiarrhythmic drug loading and repeat cardioversion.  3.  Lifestyle modifications were discussed in detail.  Patient was encouraged to increase water consumption to 64 ounces per day and reduce caffeine and alcohol consumption.  4.  I discussed with the patient and her  the importance of taking medications as prescribed for prophylaxis from thromboembolic event as well as heart rate and rhythm control.  5.  Obtain a remote device interrogation on August 14, 2024.  The patient will undergo an in clinic check in 6 months prior to the office visit with Dr. Camargo.    This office visit lasted nearly 1 hour due to numerous questions regarding the treatment of atrial fibrillation.    Evaluation and note by Michell Sheffield CNP  **Please excuse any errors in grammar or translation related to this dictation.  Voice recognition software was utilized to prepare this document.**

## 2024-05-09 NOTE — PROGRESS NOTES
"CARDIOLOGY OFFICE VISIT      CHIEF COMPLAINT  Chief Complaint   Patient presents with    Device Check   Chief complaint: \"I do not understand why I am feeling so bad.\"    HISTORY OF PRESENT ILLNESS  HPI  History: The patient is a 74-year-old  female who is followed for syncope secondary to sinus node dysfunction and transient high degree AV block status post dual-chamber pacemaker implant on February 8, 2023.  At the office visit with Dr. Camargo dated November 10, 2023 the patient was noted to be in atrial fibrillation and was placed on anticoagulation therapy with Eliquis.  The medical records reveals the patient was also prescribed metoprolol tartrate 50 mg twice a day.  In review of the medications the patient's  states he does not believe she is taking this medication.  He states he is unable to verify the medication list.  The patient states that she has been forgetting to take her medications.  She states that she also underwent dental work and was prescribed Lovenox and missed several of the doses of that medicine.  She reports that she has missed multiple doses of the Eliquis \"here and there.\"  She states that she feels like she has \"brain fog.\".  She reports that she is more easily fatigued and occasionally experiences shortness of breath with activity.  She denies palpitations, chest pain, or abdominal distention.  She does experience some mild lower extremity swelling.  She is accompanied by her  for today's office visit who states that he manages her medications.    Past Medical History  History reviewed. No pertinent past medical history.    Social History  Social History     Tobacco Use    Smoking status: Never    Smokeless tobacco: Never   Substance Use Topics    Alcohol use: Not Currently    Drug use: Not Currently       Family History     Family History   Problem Relation Name Age of Onset    Colon cancer Mother      Hypertension Father      Aneurysm Father      Pancreatic " cancer Sister          Allergies:  Allergies   Allergen Reactions    Morphine Other     Mental Status Change    Calcium Channel Blocking Agents-Dihydropyridines Other     irregular heart rate    Latex Itching and Rash    Penicillins Rash    Ace Inhibitors Cough    Alendronate Unknown    Calcium Channel Blocking Agent Diltiazem Analogues Itching    Ibuprofen Unknown    Lisinopril Cough    Metformin Diarrhea    Rybelsus [Semaglutide] Diarrhea    Sertraline Unknown    Iodine Rash        Outpatient Medications:  Current Outpatient Medications   Medication Instructions    albuterol 90 mcg/actuation inhaler 1 puff, inhalation, Every 4 hours PRN    ALPRAZolam (Xanax) 0.25 mg tablet     amLODIPine (NORVASC) 2.5 mg, oral, Every morning    apixaban (ELIQUIS) 5 mg, oral, 2 times daily    aspirin 81 mg, oral, Every other day    atorvastatin (LIPITOR) 20 mg, oral, Nightly    CALCIUM 26-VIT D3-MAGNESIUM 15 ORAL oral    cholecalciferol (VITAMIN D-3) 2,000 Units, oral, Daily RT    citalopram (CELEXA) 20 mg, oral, Every morning    cyanocobalamin (Vitamin B-12) 1,000 mcg tablet 1 tablet, oral, Daily    enoxaparin (LOVENOX) 100 mg, subcutaneous, Daily, Inject 1 syringe daily while holding your Eliquis. Hold Lovenox for a full 24 hours prior to any scheduled procedures.    esomeprazole (NEXIUM) 20 mg, oral, Daily    fluocinolone 0.01 % cream Topical, 2 times daily    fluticasone propion-salmeteroL (Advair Diskus) 100-50 mcg/dose diskus inhaler 1 puff, inhalation, 2 times daily RT, Rinse mouth with water after use to reduce aftertaste and incidence of candidiasis. Do not swallow.    furosemide (LASIX) 20 mg, oral, Every morning    insulin lispro (HumaLOG) 100 unit/mL injection INJECT 8 UNITS WITH BREAKFAST, 6 UNITS WITH LUNCH - PLUS CORRECTION 1:30>150MG/DL (MAX DAILY AMOUNT OF 40 UNITS) for 112<BR>    Lantus Solostar U-100 Insulin 16 Units, subcutaneous, Nightly    linaclotide (LINZESS ORAL) oral, As needed    metoprolol tartrate  "(LOPRESSOR) 50 mg, oral, 2 times daily    mirtazapine (REMERON) 15 mg, oral, Nightly    pen needle 1/2\" 29G X 12mm needle Every 8 hours    pen needle, diabetic (BD Ultra-Fine Short Pen Needle) 31 gauge x 5/16\" needle 1 each, subcutaneous, 4 times daily PRN    polyethylene glycol (GLYCOLAX, MIRALAX) 17 g, oral, Daily RT    potassium chloride CR 10 mEq ER tablet 20 mEq, oral, 2 times daily    prazosin (MINIPRESS) 1 mg, oral, Nightly    Prolia 60 mg, subcutaneous, Every 6 months    risperiDONE (RisperDAL) 0.5 mg tablet 1 tablet, oral, Nightly    Rybelsus 14 mg, oral, Daily, 30 MINUTES BEFORE FIRST FOOD, BEVERAGE, OR OTHER MEDICATION OF THE DAY<BR>    sennosides-docusate sodium (Guadalupe-Colace) 8.6-50 mg tablet 1 tablet, oral, Daily    solifenacin (VESICARE) 10 mg, oral, Daily          REVIEW OF SYSTEMS  Review of Systems   All other systems reviewed and are negative.        VITALS  Vitals:    05/09/24 1353   BP: 118/72   Pulse: 73       PHYSICAL EXAM  Vitals and nursing note reviewed.   Constitutional:       Appearance: Normal appearance.   HENT:      Head: Normocephalic.   Neck:      Vascular: No JVD.   Cardiovascular:      Rate and Rhythm: Normal rate and regular rhythm.      Pulses: Normal pulses.      Heart sounds: Normal heart sounds.      Trace bilateral pedal edema.  Pulmonary:      Effort: Pulmonary effort is normal.      Breath sounds: Normal breath sounds.   Abdominal:      General: Bowel sounds are normal.      Palpations: Abdomen is soft.   Musculoskeletal:         General: Normal range of motion.      Cervical back: Normal range of motion.   Skin:     General: Skin is warm and dry.  Left subclavian pacemaker pocket is well-healed without redness swelling or drainage.  Neurological:      General: No focal deficit present.      Mental Status: She is alert and oriented to person, place, and time.      Motor: Motor function is intact.   Psychiatric:         Attention and Perception: Attention and perception " normal.         Mood and Affect: Mood and affect normal.         Speech: Speech normal.         Behavior: Behavior normal. Behavior is cooperative.         Thought Content: Thought content normal.         Cognition and Memory: Cognition and memory normal.     Labs and testing: Twelve-lead EKG reveals atrial fibrillation with ventricular pacing at 73 bpm.  QRS durations 146 ms,  ms, QTc 506 ms.  Pacemaker interrogation dated May 9, 2024 reveals atrial pacing 0.24% and ventricular pacing 99.65%.  No ventricular detections were noted.  The A-fib burden is 100%.  Estimated battery longevity is 8 years and 4 months.  Review of the direct trends reveals atrial fibrillation ongoing since mid October, 2023.  Patient has been ventricularly paced 100% of the time while in atrial fibrillation.  CTA of the chest dated November 29, 2022 revealed calcified mediastinal lymph nodes, moderate hiatal hernia, mild scattered scarring of the lungs with a right upper lobe calcified granuloma.  Granulomas were also noted within the liver and spleen.      ASSESSMENT AND PLAN    Clinical impressions:  1.  Syncope secondary to sinus node dysfunction and high degree AV block status post dual-chamber pacemaker implant [facility] on February 8, 2023.  2.  Persistent atrial fibrillation prescribed Eliquis and metoprolol with patient report of noncompliance with medications.  3.  Hypertension, controlled with a blood pressure today 118/72.  4.  Dyslipidemia on statin.  5.  Diabetes mellitus.  6.  Hypokalemia on potassium replacement.  7.  CTA of the chest dated November 29, 2022 revealing calcified mediastinal lymph nodes and calcified granulomas of the right upper lobe of the lung as well as liver and spleen.  8.  Overweight with a BMI of 28.87.    Recommendations:  1.  Discussed anatomy and physiology of the documented arrhythmia.  Treatment options reviewed in detail. Instructed to take all current medications as  prescribed.  Limit caffeine and alcohol consumption to two beverages per day.  Increase water intake to 64 ounces per day.  Refrain from over the counter cold medications in tablet form.  Treat symptoms:  nasal spray for nasal congestion; zinc lozenges for sore throat; plain Robitussin or Delsym for cough; increase vitamin C intake.  Exercise five or more days per week for 30 minutes per session per American [employer] Association guidelines.  2.  I was unable to verify the patient's medications.  I requested the patient's  and reviewed the medications and compare the list provided to him from today's office visit with the medications at home and contact me regarding updating the medication list.  Further recommendations will be pending obtaining an accurate medication list.  I did discuss with the patient and her  possible antiarrhythmic drug loading and repeat cardioversion.  3.  Lifestyle modifications were discussed in detail.  Patient was encouraged to increase water consumption to 64 ounces per day and reduce caffeine and alcohol consumption.  4.  I discussed with the patient and her  the importance of taking medications as prescribed for prophylaxis from thromboembolic event as well as heart rate and rhythm control.  5.  Obtain a remote device interrogation on August 14, 2024.  The patient will undergo an in clinic check in 6 months prior to the office visit with Dr. Camargo.    This office visit lasted nearly 1 hour due to numerous questions regarding the treatment of atrial fibrillation.    Evaluation and note by Michell Sheffield CNP  **Please excuse any errors in grammar or translation related to this dictation.  Voice recognition software was utilized to prepare this document.**

## 2024-05-09 NOTE — PATIENT INSTRUCTIONS
Alternative Blood thinner:  Eliquis 5 mg twice a day  Xarelto 20mg daily  Pradaxa 150mg twice a day  Warfarin 2 mg daily as directed (requires blood work every 2-4 weeks)    Michell [phone]

## 2024-05-10 ENCOUNTER — DOCUMENTATION (OUTPATIENT)
Dept: CARDIOLOGY | Facility: CLINIC | Age: 74
End: 2024-05-10
Payer: MEDICARE

## 2024-05-10 ENCOUNTER — TELEPHONE (OUTPATIENT)
Dept: CARDIOLOGY | Facility: CLINIC | Age: 74
End: 2024-05-10
Payer: MEDICARE

## 2024-05-10 DIAGNOSIS — I48.19 PERSISTENT ATRIAL FIBRILLATION (MULTI): Primary | ICD-10-CM

## 2024-05-10 RX ORDER — METOPROLOL TARTRATE 25 MG/1
25 TABLET, FILM COATED ORAL 2 TIMES DAILY
Qty: 60 TABLET | Refills: 11 | Status: SHIPPED | OUTPATIENT
Start: 2024-05-10 | End: 2025-05-10

## 2024-05-10 NOTE — PROGRESS NOTES
The patient's  called back with a list of changes to the patient's current medication list.  The list was updated based on his review of the medications in comparison to the list in epic.  At present time the patient is not taking beta-blocker.  Patient to resume metoprolol tartrate 25 mg 1 tablet twice a day.  Instructions were written for a SHIELA guided cardioversion to be performed in 2 weeks.  Orders were forwarded to procedure scheduling to arrange.  Note tasked to PE.  Pinky for patient follow-up regarding plan of care.

## 2024-05-10 NOTE — TELEPHONE ENCOUNTER
Called  [phone] with providers recommendations.  Patient verbalized understanding  Message forwarded to procedure scheduling to arrange

## 2024-05-10 NOTE — PATIENT INSTRUCTIONS
Schedule SHIELA guided cardioversion in 2 weeks.  DX: persistent atrial fibrillation.  History of missed doses of Eliquis per ov dated 5/9/24.  Hold lasix the day of the procedure.  Take all other medications as prescribed the day of the procedure with a sip of water.  No food to eat or drink after midnight the day of the procedure.

## 2024-05-10 NOTE — TELEPHONE ENCOUNTER
----- Message from PATRICIA Monterroso-CNP sent at 5/10/2024  8:09 AM EDT -----  I tried to send this to [facility] but I can't find them in the list.

## 2024-06-04 RX ORDER — SODIUM CHLORIDE 9 MG/ML
10 INJECTION, SOLUTION INTRAVENOUS CONTINUOUS
Status: CANCELLED | OUTPATIENT
Start: 2024-06-04

## 2024-06-06 ENCOUNTER — ANESTHESIA EVENT (OUTPATIENT)
Dept: CARDIOLOGY | Facility: HOSPITAL | Age: 74
End: 2024-06-06
Payer: MEDICARE

## 2024-06-06 ENCOUNTER — HOSPITAL ENCOUNTER (OUTPATIENT)
Dept: CARDIOLOGY | Facility: HOSPITAL | Age: 74
Discharge: HOME | End: 2024-06-06
Payer: MEDICARE

## 2024-06-06 ENCOUNTER — ANESTHESIA (OUTPATIENT)
Dept: CARDIOLOGY | Facility: HOSPITAL | Age: 74
End: 2024-06-06
Payer: MEDICARE

## 2024-06-06 VITALS
DIASTOLIC BLOOD PRESSURE: 83 MMHG | SYSTOLIC BLOOD PRESSURE: 148 MMHG | RESPIRATION RATE: 18 BRPM | OXYGEN SATURATION: 93 % | HEART RATE: 70 BPM

## 2024-06-06 VITALS
TEMPERATURE: 96.6 F | DIASTOLIC BLOOD PRESSURE: 83 MMHG | BODY MASS INDEX: 28.95 KG/M2 | RESPIRATION RATE: 18 BRPM | WEIGHT: 163.36 LBS | OXYGEN SATURATION: 98 % | SYSTOLIC BLOOD PRESSURE: 148 MMHG | HEART RATE: 70 BPM | HEIGHT: 63 IN

## 2024-06-06 DIAGNOSIS — I48.19 PERSISTENT ATRIAL FIBRILLATION (MULTI): ICD-10-CM

## 2024-06-06 DIAGNOSIS — I48.91 UNSPECIFIED ATRIAL FIBRILLATION (MULTI): ICD-10-CM

## 2024-06-06 LAB
ANION GAP SERPL CALC-SCNC: 10 MMOL/L (ref 10–20)
APTT PPP: 38 SECONDS (ref 27–38)
ATRIAL RATE: 73 BPM
BODY SURFACE AREA: 1.81 M2
BUN SERPL-MCNC: 30 MG/DL (ref 6–23)
CALCIUM SERPL-MCNC: 10 MG/DL (ref 8.6–10.3)
CHLORIDE SERPL-SCNC: 102 MMOL/L (ref 98–107)
CO2 SERPL-SCNC: 32 MMOL/L (ref 21–32)
CREAT SERPL-MCNC: 1.09 MG/DL (ref 0.5–1.05)
EGFRCR SERPLBLD CKD-EPI 2021: 53 ML/MIN/1.73M*2
GLUCOSE SERPL-MCNC: 223 MG/DL (ref 74–99)
INR PPP: 1.7 (ref 0.9–1.1)
POTASSIUM SERPL-SCNC: 4 MMOL/L (ref 3.5–5.3)
PROTHROMBIN TIME: 19.5 SECONDS (ref 9.8–12.8)
Q ONSET: 191 MS
QRS COUNT: 12 BEATS
QRS DURATION: 160 MS
QT INTERVAL: 470 MS
QTC CALCULATION(BAZETT): 521 MS
QTC FREDERICIA: 504 MS
R AXIS: -89 DEGREES
SODIUM SERPL-SCNC: 140 MMOL/L (ref 136–145)
T AXIS: 93 DEGREES
T OFFSET: 426 MS
VENTRICULAR RATE: 74 BPM

## 2024-06-06 PROCEDURE — 93280 PM DEVICE PROGR EVAL DUAL: CPT | Performed by: INTERNAL MEDICINE

## 2024-06-06 PROCEDURE — 7100000010 HC PHASE TWO TIME - EACH INCREMENTAL 1 MINUTE

## 2024-06-06 PROCEDURE — 99152 MOD SED SAME PHYS/QHP 5/>YRS: CPT | Performed by: INTERNAL MEDICINE

## 2024-06-06 PROCEDURE — 2500000004 HC RX 250 GENERAL PHARMACY W/ HCPCS (ALT 636 FOR OP/ED): Performed by: INTERNAL MEDICINE

## 2024-06-06 PROCEDURE — 93280 PM DEVICE PROGR EVAL DUAL: CPT

## 2024-06-06 PROCEDURE — 3700000002 HC GENERAL ANESTHESIA TIME - EACH INCREMENTAL 1 MINUTE

## 2024-06-06 PROCEDURE — 3700000001 HC GENERAL ANESTHESIA TIME - INITIAL BASE CHARGE

## 2024-06-06 PROCEDURE — 36415 COLL VENOUS BLD VENIPUNCTURE: CPT | Performed by: NURSE PRACTITIONER

## 2024-06-06 PROCEDURE — 2500000004 HC RX 250 GENERAL PHARMACY W/ HCPCS (ALT 636 FOR OP/ED): Performed by: NURSE PRACTITIONER

## 2024-06-06 PROCEDURE — 2500000005 HC RX 250 GENERAL PHARMACY W/O HCPCS: Performed by: INTERNAL MEDICINE

## 2024-06-06 PROCEDURE — 93010 ELECTROCARDIOGRAM REPORT: CPT | Performed by: INTERNAL MEDICINE

## 2024-06-06 PROCEDURE — 93005 ELECTROCARDIOGRAM TRACING: CPT | Mod: 59

## 2024-06-06 PROCEDURE — 80048 BASIC METABOLIC PNL TOTAL CA: CPT | Performed by: NURSE PRACTITIONER

## 2024-06-06 PROCEDURE — 7100000009 HC PHASE TWO TIME - INITIAL BASE CHARGE

## 2024-06-06 PROCEDURE — 85730 THROMBOPLASTIN TIME PARTIAL: CPT | Performed by: NURSE PRACTITIONER

## 2024-06-06 PROCEDURE — 92960 CARDIOVERSION ELECTRIC EXT: CPT

## 2024-06-06 PROCEDURE — 92960 CARDIOVERSION ELECTRIC EXT: CPT | Performed by: INTERNAL MEDICINE

## 2024-06-06 PROCEDURE — 99152 MOD SED SAME PHYS/QHP 5/>YRS: CPT

## 2024-06-06 PROCEDURE — 2500000004 HC RX 250 GENERAL PHARMACY W/ HCPCS (ALT 636 FOR OP/ED): Performed by: NURSE ANESTHETIST, CERTIFIED REGISTERED

## 2024-06-06 RX ORDER — PROPOFOL 10 MG/ML
INJECTION, EMULSION INTRAVENOUS AS NEEDED
Status: DISCONTINUED | OUTPATIENT
Start: 2024-06-06 | End: 2024-06-06

## 2024-06-06 RX ORDER — GABAPENTIN 100 MG/1
100 CAPSULE ORAL 3 TIMES DAILY PRN
COMMUNITY

## 2024-06-06 RX ORDER — LIDOCAINE HYDROCHLORIDE 20 MG/ML
JELLY TOPICAL AS NEEDED
Status: DISCONTINUED | OUTPATIENT
Start: 2024-06-06 | End: 2024-06-06 | Stop reason: HOSPADM

## 2024-06-06 RX ORDER — HYDROGEN PEROXIDE 3 %
20 SOLUTION, NON-ORAL MISCELLANEOUS 2 TIMES DAILY PRN
COMMUNITY

## 2024-06-06 RX ORDER — FENTANYL CITRATE 50 UG/ML
INJECTION, SOLUTION INTRAMUSCULAR; INTRAVENOUS AS NEEDED
Status: DISCONTINUED | OUTPATIENT
Start: 2024-06-06 | End: 2024-06-06 | Stop reason: HOSPADM

## 2024-06-06 RX ORDER — SODIUM CHLORIDE 9 MG/ML
20 INJECTION, SOLUTION INTRAVENOUS CONTINUOUS
Status: DISCONTINUED | OUTPATIENT
Start: 2024-06-06 | End: 2024-06-07 | Stop reason: HOSPADM

## 2024-06-06 RX ORDER — ACETAMINOPHEN 500 MG
500 TABLET ORAL EVERY 6 HOURS PRN
COMMUNITY

## 2024-06-06 RX ORDER — MIDAZOLAM HYDROCHLORIDE 1 MG/ML
INJECTION, SOLUTION INTRAMUSCULAR; INTRAVENOUS AS NEEDED
Status: DISCONTINUED | OUTPATIENT
Start: 2024-06-06 | End: 2024-06-06 | Stop reason: HOSPADM

## 2024-06-06 RX ADMIN — LIDOCAINE HYDROCHLORIDE 1 APPLICATION: 20 JELLY TOPICAL at 09:40

## 2024-06-06 RX ADMIN — PROPOFOL 50 MG: 10 INJECTION, EMULSION INTRAVENOUS at 12:21

## 2024-06-06 RX ADMIN — MIDAZOLAM 1 MG: 1 INJECTION INTRAMUSCULAR; INTRAVENOUS at 09:43

## 2024-06-06 RX ADMIN — Medication 3 L/MIN: at 09:32

## 2024-06-06 RX ADMIN — MIDAZOLAM 1 MG: 1 INJECTION INTRAMUSCULAR; INTRAVENOUS at 09:45

## 2024-06-06 RX ADMIN — SODIUM CHLORIDE 20 ML/HR: 9 INJECTION, SOLUTION INTRAVENOUS at 08:17

## 2024-06-06 RX ADMIN — FENTANYL CITRATE 25 MCG: 50 INJECTION, SOLUTION INTRAMUSCULAR; INTRAVENOUS at 09:42

## 2024-06-06 RX ADMIN — BENZOCAINE, BUTAMBEN, AND TETRACAINE HYDROCHLORIDE 2 SPRAY: .028; .004; .004 AEROSOL, SPRAY TOPICAL at 09:39

## 2024-06-06 SDOH — HEALTH STABILITY: MENTAL HEALTH: CURRENT SMOKER: 0

## 2024-06-06 ASSESSMENT — COLUMBIA-SUICIDE SEVERITY RATING SCALE - C-SSRS
2. HAVE YOU ACTUALLY HAD ANY THOUGHTS OF KILLING YOURSELF?: NO
1. IN THE PAST MONTH, HAVE YOU WISHED YOU WERE DEAD OR WISHED YOU COULD GO TO SLEEP AND NOT WAKE UP?: NO
6. HAVE YOU EVER DONE ANYTHING, STARTED TO DO ANYTHING, OR PREPARED TO DO ANYTHING TO END YOUR LIFE?: NO

## 2024-06-06 ASSESSMENT — PAIN SCALES - GENERAL: PAIN_LEVEL: 0

## 2024-06-06 NOTE — ANESTHESIA POSTPROCEDURE EVALUATION
Patient: Twyla Waller    Procedure Summary       Date: 06/06/24 Room / Location: [facility]    Anesthesia Start: 1214 Anesthesia Stop: 1226    Procedure: CARDIOVERSION EXTERNAL Diagnosis: Persistent atrial fibrillation (Multi)    Scheduled Providers: Ofelia Camargo MD Responsible Provider: Mono Garcia MD    Anesthesia Type: MAC ASA Status: 3            Anesthesia Type: MAC    Vitals Value Taken Time   /83 06/06/24 1227   Temp 36.5 06/06/24 1227   Pulse 65 06/06/24 1227   Resp 18 06/06/24 1227   SpO2 96 06/06/24 1227       Anesthesia Post Evaluation    Patient location during evaluation: PACU  Patient participation: complete - patient participated  Level of consciousness: awake  Pain score: 0  Pain management: adequate  Airway patency: patent  Cardiovascular status: acceptable and stable  Respiratory status: acceptable  Hydration status: acceptable  Postoperative Nausea and Vomiting: none      No notable events documented.

## 2024-06-06 NOTE — ANESTHESIA PREPROCEDURE EVALUATION
Patient: Twyla Waller    Procedure Information       Date/Time: 06/06/24 1500    Scheduled providers: Ofelia Camargo MD    Procedure: CARDIOVERSION EXTERNAL    Location: [facility]            Relevant Problems   Cardiac   (+) AV block   (+) Benign essential hypertension   (+) Chest pain   (+) High-grade atrioventricular block   (+) Hyperlipidemia   (+) Hypertensive disorder   (+) Pacemaker   (+) Sinus node dysfunction (Multi)      Pulmonary   (+) Obstructive sleep apnea (adult) (pediatric)      Neuro   (+) Depression   (+) PTSD (post-traumatic stress disorder)      GI   (+) Dysphagia   (+) Irritable bowel syndrome with constipation      /Renal   (+) Renal calculi      Endocrine   (+) Other obesity due to excess calories   (+) 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)   (+) Type 2 diabetes mellitus (Multi)      Hematology   (+) Iron deficiency anemia secondary to inadequate dietary iron intake      Musculoskeletal   (+) Fibromyalgia muscle pain   (+) Localized osteoarthritis of knees, bilateral   (+) Localized primary osteoarthritis of right lower leg   (+) Osteoarthritis of knee   (+) Primary osteoarthritis of cervical spine       Clinical information reviewed:   Tobacco  Allergies  Meds   Med Hx  Surg Hx   Fam Hx  Soc Hx        NPO Detail:  NPO/Void Status  Date of Last Liquid: 06/06/24  Time of Last Liquid: 0600  Date of Last Solid: 06/05/24  Time of Last Solid: 1900  Last Intake Type: Clear fluids  Time of Last Void: 0600         Physical Exam    Airway  Mallampati: II     Cardiovascular   Rhythm: irregular  Rate: abnormal     Dental - normal exam     Pulmonary - normal exam     Abdominal - normal exam         Anesthesia Plan    History of general anesthesia?: yes  History of complications of general anesthesia?: no    ASA 3     MAC     The patient is not a current smoker.  Patient was not previously instructed to abstain from smoking on day of procedure.  Patient did not smoke on day of  procedure.    Anesthetic plan and risks discussed with patient.  Use of blood products discussed with patient who.

## 2024-06-06 NOTE — PROGRESS NOTES
Patient is stable s/p SHIELA/DCC under the care of Dr. Lara and Dr. Camargo.  Please see procedural reports for complete details.  Patient underwent successful DCC to a NSR.  She tolerated the procedures well.  Patients daughter and  had multiple questions about management of her atrial fibrillation.  They were concerned about why she was not contacted by someone that she's been in atrial fibrillation since October, 2023.  Discussed at length the protocol for follow up and how the device clinic operates.  Instructed patient and family that she should always seek medical attention if she is not feeling well.  Discussed at length the atrial fibrillation diagnosis, potential causes, risk factors, medications and different treatment options.  Patient is scheduled to follow up with the electrophysiology service in 2 weeks and an ECG will be obtained at that time and device check if necessary.  Further recommendations will be made at that time depending on how she is feeling and what her heart rhythm is at that time.  Multiple questions were answered.  All verbalized an understanding. All were appreciative of care and education.

## 2024-06-06 NOTE — PROGRESS NOTES
I've attempted multiple times today to call the contact number [phone] that is listed in the chart. That phone number has a fast busy tone and doesn't connect.  I also left a message tonight at [phone] as a followup call after cardioversion performed today  I noted that it was mentioned about atrial fibrillation duration (and reviewed NP's note).   I stated on my voicemail message that I had addressed atrial fibrillation with patient at office in November 2023 and started Eliquis at that time.  Lastly, I notified inpatient EP NP about followup as above.

## 2024-06-06 NOTE — SIGNIFICANT EVENT
Pt back from SHIELA procedure, sleepy but arousable, no complaints of pain at this time, notified family that pt is in room, reminded pt and family nothing to eat or drink until cardioversion comlete, verbalized understanding

## 2024-06-06 NOTE — Clinical Note
Anesthesia at bedside 
Patient ID band present and verified.
Patient connected to EP monitors
Report was give by SOHAM Unger RN to LIANA Suarez in [facility].
Universal procedure checklist and airway assessment completed.
Patient with one or more new problems requiring additional work-up/treatment.

## 2024-06-06 NOTE — INTERVAL H&P NOTE
H&P reviewed. The patient was examined and there are no changes to the H&P.    In addition,  She has fatigue, dyspnea, and palpitation.    Shared decision making.  All questions answered.  Econsent obtained.

## 2024-06-06 NOTE — POST-PROCEDURE NOTE
Physician Transition of Care Summary  Invasive Cardiovascular Lab    Procedure Date: 06/06/24     Pre Procedure Indications:   Persistent A-fib, precardioversion SHIELA.    Post Procedure Diagnosis:   Normal size left atrial appendage with no obvious thrombus or masses visualized.    Procedure(s):   Transesophageal echocardiogram    Procedure Findings:   Normal left ventricular systolic function with EF of 55 to 60%.   Mild biatrial enlargement  Mild to moderate mitral regurgitation.  Moderate to severe tricuspid regurgitation.  Negative bubble study with no PFO/ASD visualized.  Mild plaque in descending thoracic aorta.  Pacemaker wire visualized in the right atrium with no obvious masses or vegetations.      Description of the Procedure:   Transesophageal echocardiogram    Complications:   None    Estimated Blood Loss:   None    Anesthesia: Conscious sedation     any Specimen(s) Removed: None      Electronically signed by: Yaron Lara MD, 6/6/2024

## 2024-06-06 NOTE — SIGNIFICANT EVENT
Discharge instructions given to pt and family including follow up and medications, verbalized understanding

## 2024-06-08 LAB — RIGHT VENTRICLE PEAK SYSTOLIC PRESSURE: 37.3 MMHG

## 2024-06-09 LAB
ATRIAL RATE: 87 BPM
P AXIS: 4 DEGREES
P OFFSET: 152 MS
P ONSET: 72 MS
PR INTERVAL: 240 MS
Q ONSET: 192 MS
QRS COUNT: 15 BEATS
QRS DURATION: 160 MS
QT INTERVAL: 420 MS
QTC CALCULATION(BAZETT): 505 MS
QTC FREDERICIA: 475 MS
R AXIS: -82 DEGREES
T AXIS: 95 DEGREES
T OFFSET: 402 MS
VENTRICULAR RATE: 87 BPM

## 2024-06-19 LAB
BODY SURFACE AREA: 1.81 M2
RIGHT VENTRICLE PEAK SYSTOLIC PRESSURE: 37.3 MMHG

## 2024-06-21 ENCOUNTER — APPOINTMENT (OUTPATIENT)
Dept: CARDIOLOGY | Facility: CLINIC | Age: 74
End: 2024-06-21
Payer: MEDICARE

## 2024-06-21 ENCOUNTER — HOSPITAL ENCOUNTER (OUTPATIENT)
Dept: CARDIOLOGY | Facility: HOSPITAL | Age: 74
Discharge: HOME | End: 2024-06-21
Payer: MEDICARE

## 2024-06-21 VITALS
BODY MASS INDEX: 28.43 KG/M2 | SYSTOLIC BLOOD PRESSURE: 104 MMHG | DIASTOLIC BLOOD PRESSURE: 80 MMHG | WEIGHT: 160.5 LBS | HEART RATE: 81 BPM

## 2024-06-21 DIAGNOSIS — I44.39 HIGH-GRADE ATRIOVENTRICULAR BLOCK: ICD-10-CM

## 2024-06-21 DIAGNOSIS — R07.9 CHEST PAIN, UNSPECIFIED TYPE: ICD-10-CM

## 2024-06-21 DIAGNOSIS — I50.32 CHRONIC DIASTOLIC HEART FAILURE (MULTI): ICD-10-CM

## 2024-06-21 DIAGNOSIS — E78.2 MIXED HYPERLIPIDEMIA: ICD-10-CM

## 2024-06-21 DIAGNOSIS — I45.89 CHRONOTROPIC INCOMPETENCE: ICD-10-CM

## 2024-06-21 DIAGNOSIS — Z95.0 PACEMAKER: ICD-10-CM

## 2024-06-21 DIAGNOSIS — I10 BENIGN ESSENTIAL HYPERTENSION: ICD-10-CM

## 2024-06-21 DIAGNOSIS — I44.30 AV BLOCK: ICD-10-CM

## 2024-06-21 DIAGNOSIS — I49.5 SINUS NODE DYSFUNCTION (MULTI): ICD-10-CM

## 2024-06-21 DIAGNOSIS — I47.19 ATRIAL TACHYCARDIA (CMS-HCC): Primary | ICD-10-CM

## 2024-06-21 DIAGNOSIS — I10 PRIMARY HYPERTENSION: ICD-10-CM

## 2024-06-21 DIAGNOSIS — I95.1 ORTHOSTATIC HYPOTENSION: ICD-10-CM

## 2024-06-21 PROCEDURE — 93280 PM DEVICE PROGR EVAL DUAL: CPT

## 2024-06-21 PROCEDURE — 93000 ELECTROCARDIOGRAM COMPLETE: CPT | Performed by: INTERNAL MEDICINE

## 2024-06-21 RX ORDER — SOLIFENACIN SUCCINATE 10 MG/1
1 TABLET, FILM COATED ORAL
COMMUNITY
Start: 2024-05-12

## 2024-06-21 NOTE — PROGRESS NOTES
"CARDIOLOGY OFFICE VISIT      CHIEF COMPLAINT  Chief Complaint   Patient presents with    Follow-up     Pt is here today following up two weeks post cardioversion      Chief complaint: \"I just do not have any energy.\"  HISTORY OF PRESENT ILLNESS  HPI  History: The patient is a 74-year-old  female who is followed for syncope secondary to sinus node dysfunction and transient high degree AV block status post dual-chamber pacemaker implant on February 8, 2023.  She underwent a SHIELA guided cardioversion on June 6, 2024 with restoration of sinus rhythm and presents the office today for follow-up evaluation.  She states she does not have any energy.  She is also experiencing dizziness with sitting which is worsened with position changes.  She denies near or madan syncope, chest pain, palpitations, shortness of breath, abdominal distention, or lower extremity edema.  She states that her breathing is better since the cardioversion.  Patient is accompanied by her  for today's office visit.  He states that her blood pressure is never as low as what was obtained on her initial set of vital signs.  Past Medical History  Past Medical History:   Diagnosis Date    Arrhythmia     A-fib    Diabetes mellitus (Multi)     Hyperlipidemia     Hypertension        Social History  Social History     Tobacco Use    Smoking status: Never    Smokeless tobacco: Never   Vaping Use    Vaping status: Never Used   Substance Use Topics    Alcohol use: Not Currently    Drug use: Not Currently       Family History     Family History   Problem Relation Name Age of Onset    Colon cancer Mother      Hypertension Father      Aneurysm Father      Pancreatic cancer Sister          Allergies:  Allergies   Allergen Reactions    Morphine Other     Mental Status Change    Verapamil Unknown    Calcium Channel Blocking Agents-Dihydropyridines Other     irregular heart rate    Latex Itching and Rash    Penicillins Rash    Ace Inhibitors Cough    " "Alendronate Unknown    Calcium Channel Blocking Agent Diltiazem Analogues Itching    Ibuprofen Unknown    Lisinopril Cough    Metformin Diarrhea    Rybelsus [Semaglutide] Diarrhea    Sertraline Unknown    Iodine Rash        Outpatient Medications:  Current Outpatient Medications   Medication Instructions    acetaminophen (TYLENOL) 500 mg, oral, Every 6 hours PRN    albuterol 90 mcg/actuation inhaler 1 puff, inhalation, Every 4 hours PRN    amLODIPine (NORVASC) 2.5 mg, oral, Every morning    apixaban (ELIQUIS) 5 mg, oral, 2 times daily    atorvastatin (LIPITOR) 20 mg, oral, Nightly    CALCIUM 26-VIT D3-MAGNESIUM 15 ORAL oral    cholecalciferol (VITAMIN D-3) 2,000 Units, oral, Daily RT    citalopram (CELEXA) 20 mg, oral, Every morning    cranberry fruit concentrate (AZO CRANBERRY ORAL) 1 tablet, oral, Daily (0630)    cyanocobalamin (Vitamin B-12) 1,000 mcg tablet 1 tablet, oral, Daily    esomeprazole (NEXIUM) 20 mg, oral, 2 times daily PRN, Do not open capsule.    fluocinolone 0.01 % cream Topical, As needed    fluticasone propion-salmeteroL (Advair Diskus) 100-50 mcg/dose diskus inhaler 1 puff, inhalation, 2 times daily RT, Rinse mouth with water after use to reduce aftertaste and incidence of candidiasis. Do not swallow.    furosemide (LASIX) 20 mg, oral, Daily PRN    gabapentin (NEURONTIN) 100 mg, oral, 3 times daily PRN    insulin lispro (HUMALOG) 10 Units, subcutaneous, See admin instructions, Sliding scale<BR>    Lantus Solostar U-100 Insulin 22 Units, subcutaneous, Every morning    linaclotide (LINZESS ORAL) oral, As needed    metoprolol tartrate (LOPRESSOR) 25 mg, oral, 2 times daily    mirtazapine (REMERON) 15 mg, oral, Nightly, ON HOLD     pen needle 1/2\" 29G X 12mm needle Every 8 hours    pen needle, diabetic (BD Ultra-Fine Short Pen Needle) 31 gauge x 5/16\" needle 1 each, subcutaneous, 4 times daily PRN    polyethylene glycol (GLYCOLAX, MIRALAX) 17 g, oral, Daily RT    potassium chloride CR 10 mEq ER " tablet 10 mEq, oral, 2 times daily    Prolia 60 mg, subcutaneous, Every 6 months    risperiDONE (RisperDAL) 0.5 mg tablet 1 tablet, oral, Nightly, ON HOLD    Rybelsus 14 mg, oral, Daily, 30 MINUTES BEFORE FIRST FOOD, BEVERAGE, OR OTHER MEDICATION OF THE DAY<BR>    sennosides-docusate sodium (Guadalupe-Colace) 8.6-50 mg tablet 1 tablet, oral, Daily    solifenacin (VESIcare) 10 mg tablet 1 tablet, oral, Daily (0630)          REVIEW OF SYSTEMS  Review of Systems   All other systems reviewed and are negative.        VITALS  Vitals:    06/21/24 1231   BP: 104/80   Pulse: 81       PHYSICAL EXAM  Vitals and nursing note reviewed.   Constitutional:       Appearance: Normal appearance.   HENT:      Head: Normocephalic.   Neck:      Vascular: No JVD. Carotid upstrokes II/IV.  Cardiovascular:      Rate and Rhythm: Normal rate and regular rhythm.      Pulses: Normal pulses.      Heart sounds: Normal S1 S2, no S3 S4.  No murmurs or rubs.  Pulmonary:      Effort: Pulmonary effort is normal. Respirations regular and nonlabored.     Breath sounds: Clear to auscultation posterior laterally.  Abdominal:      General: Bowel sounds are normal.      Palpations: Abdomen is soft.   Musculoskeletal:         General: Normal range of motion.      Cervical back: Normal range of motion.   Skin:     General: Skin is pale, warm and dry.  Left anterior axillary pacemaker incision is well-healed without redness swelling or drainage.  Neurological:      General: No focal deficit present.      Mental Status: Lethargic and oriented to person, place, and time.      Motor: Motor function is intact.   Psychiatric:         Attention and Perception: Attention and perception normal.         Mood and Affect: Mood and affect normal.         Speech: Speech normal.         Behavior: Behavior normal. Behavior is cooperative.         Thought Content: Thought content normal.         Cognition and Memory: Cognition and memory normal.     Labs and testing: Twelve-lead  EKG reveals intermittent atrial pacing and persistent ventricular pacing at 81 bpm.  QRS durations 146 ms,  ms, QTc 469 ms.  SHIELA dated June 6, 2024 reveals an ejection fraction of 55 to 60%, moderate left atrial enlargement, moderate MR and TR, mild AR, plaque in the descending aorta.      ASSESSMENT AND PLAN    Clinical impressions:  1.  Syncope secondary to sinus node dysfunction and high degree AV block status post dual-chamber pacemaker implant ([employer]ERY DR) on February 8, 2023.  2.  Persistent atrial fibrillation controlled on metoprolol and anticoagulated with Eliquis.    3.  Hypertension, controlled with a blood pressure today 104/80.  4.  Dyslipidemia on statin.  5.  Diabetes mellitus.  6.  Hypokalemia on potassium replacement.  7.  CTA of the chest dated November 29, 2022 revealing calcified mediastinal lymph nodes and calcified granulomas of the right upper lobe of the lung as well as liver and spleen.  8.  Overweight with a BMI of 28.43.    Recommendations:  1.  I reviewed the device interrogation dated November, 2023 which revealed ventricular pacing at 55%.  Patient will undergo an in clinic device check today for evaluation and possible reprogramming to reduce ventricular pacing.  2.  Patient was instructed to discontinue the amlodipine due to low blood pressure reading of 104/80 initially and 104/76 subsequent.  She was also in instructed to increase water consumption to 64 ounces per day.  The patient's  questioned possibly discontinuing the metoprolol.  The patient is staying in rhythm at that time.  I discussed with him making 1 medication adjustment at a time.  If the patient's blood pressure remains low we could consider reducing the metoprolol to tartrate from 25 mg twice a day to 12.5 mg twice a day.  3.  Obtain an in clinic device check in 3 months.  4.  Follow-up in office with Dr. Camargo in 3 months or sooner if needed.  5.  Patient's  states that her  mirtazapine and risperidone were also discontinued by her primary care physician as they are also known to cause fatigue.  6.  Further recommendations will be pending the clinical course.    Evaluation and note by Michell Sheffield CNP  **Please excuse any errors in grammar or translation related to this dictation.  Voice recognition software was utilized to prepare this document.**

## 2024-06-21 NOTE — PROGRESS NOTES
In clinic device check revealed atrial tachycardia.  Patient to be scheduled for outpatient cardioversion with attempt at restoration of sinus rhythm.  Orders written and forwarded to scheduling to arrange.  Take all current medications as prescribed the day of the procedure with a small sip of water.  No food to eat or drink after midnight the day of the procedure.  Physician: Dr. Camargo.  [employer] pacemaker

## 2024-06-24 ENCOUNTER — HOSPITAL ENCOUNTER (INPATIENT)
Facility: HOSPITAL | Age: 74
LOS: 2 days | Discharge: HOME HEALTH | End: 2024-06-27
Attending: STUDENT IN AN ORGANIZED HEALTH CARE EDUCATION/TRAINING PROGRAM | Admitting: STUDENT IN AN ORGANIZED HEALTH CARE EDUCATION/TRAINING PROGRAM
Payer: MEDICARE

## 2024-06-24 ENCOUNTER — APPOINTMENT (OUTPATIENT)
Dept: CARDIOLOGY | Facility: HOSPITAL | Age: 74
End: 2024-06-24
Payer: MEDICARE

## 2024-06-24 ENCOUNTER — APPOINTMENT (OUTPATIENT)
Dept: RADIOLOGY | Facility: HOSPITAL | Age: 74
End: 2024-06-24
Payer: MEDICARE

## 2024-06-24 DIAGNOSIS — R55 NEAR SYNCOPE: ICD-10-CM

## 2024-06-24 DIAGNOSIS — Z95.0 PACEMAKER: ICD-10-CM

## 2024-06-24 DIAGNOSIS — I47.19 ATRIAL TACHYCARDIA (CMS-HCC): ICD-10-CM

## 2024-06-24 DIAGNOSIS — R53.83 FATIGUE, UNSPECIFIED TYPE: Primary | ICD-10-CM

## 2024-06-24 DIAGNOSIS — N30.90 CYSTITIS: ICD-10-CM

## 2024-06-24 PROBLEM — R42 LIGHTHEADED: Status: ACTIVE | Noted: 2024-06-24

## 2024-06-24 LAB
ALBUMIN SERPL BCP-MCNC: 3.5 G/DL (ref 3.4–5)
ALP SERPL-CCNC: 71 U/L (ref 33–136)
ALT SERPL W P-5'-P-CCNC: 23 U/L (ref 7–45)
ANION GAP SERPL CALC-SCNC: 10 MMOL/L (ref 10–20)
APPEARANCE UR: CLEAR
AST SERPL W P-5'-P-CCNC: 22 U/L (ref 9–39)
BACTERIA #/AREA URNS AUTO: ABNORMAL /HPF
BASOPHILS # BLD AUTO: 0.04 X10*3/UL (ref 0–0.1)
BASOPHILS NFR BLD AUTO: 0.3 %
BILIRUB SERPL-MCNC: 0.5 MG/DL (ref 0–1.2)
BILIRUB UR STRIP.AUTO-MCNC: NEGATIVE MG/DL
BUN SERPL-MCNC: 31 MG/DL (ref 6–23)
CALCIUM SERPL-MCNC: 9.5 MG/DL (ref 8.6–10.3)
CARDIAC TROPONIN I PNL SERPL HS: 8 NG/L (ref 0–13)
CARDIAC TROPONIN I PNL SERPL HS: 9 NG/L (ref 0–13)
CHLORIDE SERPL-SCNC: 102 MMOL/L (ref 98–107)
CK SERPL-CCNC: 15 U/L (ref 0–215)
CO2 SERPL-SCNC: 30 MMOL/L (ref 21–32)
COLOR UR: ABNORMAL
CREAT SERPL-MCNC: 1.19 MG/DL (ref 0.5–1.05)
EGFRCR SERPLBLD CKD-EPI 2021: 48 ML/MIN/1.73M*2
EOSINOPHIL # BLD AUTO: 0.04 X10*3/UL (ref 0–0.4)
EOSINOPHIL NFR BLD AUTO: 0.3 %
ERYTHROCYTE [DISTWIDTH] IN BLOOD BY AUTOMATED COUNT: 14.5 % (ref 11.5–14.5)
GLUCOSE BLD MANUAL STRIP-MCNC: 279 MG/DL (ref 74–99)
GLUCOSE SERPL-MCNC: 187 MG/DL (ref 74–99)
GLUCOSE UR STRIP.AUTO-MCNC: NORMAL MG/DL
HCT VFR BLD AUTO: 44.6 % (ref 36–46)
HGB BLD-MCNC: 15 G/DL (ref 12–16)
IMM GRANULOCYTES # BLD AUTO: 0.05 X10*3/UL (ref 0–0.5)
IMM GRANULOCYTES NFR BLD AUTO: 0.4 % (ref 0–0.9)
KETONES UR STRIP.AUTO-MCNC: NEGATIVE MG/DL
LEUKOCYTE ESTERASE UR QL STRIP.AUTO: ABNORMAL
LYMPHOCYTES # BLD AUTO: 1.87 X10*3/UL (ref 0.8–3)
LYMPHOCYTES NFR BLD AUTO: 16 %
MAGNESIUM SERPL-MCNC: 1.48 MG/DL (ref 1.6–2.4)
MCH RBC QN AUTO: 33.4 PG (ref 26–34)
MCHC RBC AUTO-ENTMCNC: 33.6 G/DL (ref 32–36)
MCV RBC AUTO: 99 FL (ref 80–100)
MONOCYTES # BLD AUTO: 0.6 X10*3/UL (ref 0.05–0.8)
MONOCYTES NFR BLD AUTO: 5.1 %
NEUTROPHILS # BLD AUTO: 9.06 X10*3/UL (ref 1.6–5.5)
NEUTROPHILS NFR BLD AUTO: 77.9 %
NITRITE UR QL STRIP.AUTO: NEGATIVE
NRBC BLD-RTO: 0 /100 WBCS (ref 0–0)
PH UR STRIP.AUTO: 6 [PH]
PHOSPHATE SERPL-MCNC: 2.2 MG/DL (ref 2.5–4.9)
PLATELET # BLD AUTO: 259 X10*3/UL (ref 150–450)
POTASSIUM SERPL-SCNC: 3.9 MMOL/L (ref 3.5–5.3)
PROT SERPL-MCNC: 6.2 G/DL (ref 6.4–8.2)
PROT UR STRIP.AUTO-MCNC: NEGATIVE MG/DL
RBC # BLD AUTO: 4.49 X10*6/UL (ref 4–5.2)
RBC # UR STRIP.AUTO: NEGATIVE /UL
RBC #/AREA URNS AUTO: ABNORMAL /HPF
SODIUM SERPL-SCNC: 138 MMOL/L (ref 136–145)
SP GR UR STRIP.AUTO: 1.02
SQUAMOUS #/AREA URNS AUTO: ABNORMAL /HPF
UROBILINOGEN UR STRIP.AUTO-MCNC: NORMAL MG/DL
WBC # BLD AUTO: 11.7 X10*3/UL (ref 4.4–11.3)
WBC #/AREA URNS AUTO: ABNORMAL /HPF

## 2024-06-24 PROCEDURE — 81001 URINALYSIS AUTO W/SCOPE: CPT | Performed by: STUDENT IN AN ORGANIZED HEALTH CARE EDUCATION/TRAINING PROGRAM

## 2024-06-24 PROCEDURE — 96368 THER/DIAG CONCURRENT INF: CPT

## 2024-06-24 PROCEDURE — 2500000001 HC RX 250 WO HCPCS SELF ADMINISTERED DRUGS (ALT 637 FOR MEDICARE OP): Performed by: STUDENT IN AN ORGANIZED HEALTH CARE EDUCATION/TRAINING PROGRAM

## 2024-06-24 PROCEDURE — 70450 CT HEAD/BRAIN W/O DYE: CPT

## 2024-06-24 PROCEDURE — 80053 COMPREHEN METABOLIC PANEL: CPT | Performed by: STUDENT IN AN ORGANIZED HEALTH CARE EDUCATION/TRAINING PROGRAM

## 2024-06-24 PROCEDURE — 2500000002 HC RX 250 W HCPCS SELF ADMINISTERED DRUGS (ALT 637 FOR MEDICARE OP, ALT 636 FOR OP/ED): Performed by: STUDENT IN AN ORGANIZED HEALTH CARE EDUCATION/TRAINING PROGRAM

## 2024-06-24 PROCEDURE — G0378 HOSPITAL OBSERVATION PER HR: HCPCS

## 2024-06-24 PROCEDURE — 82947 ASSAY GLUCOSE BLOOD QUANT: CPT

## 2024-06-24 PROCEDURE — 82550 ASSAY OF CK (CPK): CPT | Performed by: STUDENT IN AN ORGANIZED HEALTH CARE EDUCATION/TRAINING PROGRAM

## 2024-06-24 PROCEDURE — 96365 THER/PROPH/DIAG IV INF INIT: CPT

## 2024-06-24 PROCEDURE — 85025 COMPLETE CBC W/AUTO DIFF WBC: CPT | Performed by: STUDENT IN AN ORGANIZED HEALTH CARE EDUCATION/TRAINING PROGRAM

## 2024-06-24 PROCEDURE — 99285 EMERGENCY DEPT VISIT HI MDM: CPT

## 2024-06-24 PROCEDURE — 96366 THER/PROPH/DIAG IV INF ADDON: CPT

## 2024-06-24 PROCEDURE — 84484 ASSAY OF TROPONIN QUANT: CPT | Performed by: STUDENT IN AN ORGANIZED HEALTH CARE EDUCATION/TRAINING PROGRAM

## 2024-06-24 PROCEDURE — 70450 CT HEAD/BRAIN W/O DYE: CPT | Performed by: RADIOLOGY

## 2024-06-24 PROCEDURE — 99223 1ST HOSP IP/OBS HIGH 75: CPT | Performed by: STUDENT IN AN ORGANIZED HEALTH CARE EDUCATION/TRAINING PROGRAM

## 2024-06-24 PROCEDURE — 83735 ASSAY OF MAGNESIUM: CPT | Performed by: STUDENT IN AN ORGANIZED HEALTH CARE EDUCATION/TRAINING PROGRAM

## 2024-06-24 PROCEDURE — 36415 COLL VENOUS BLD VENIPUNCTURE: CPT | Performed by: STUDENT IN AN ORGANIZED HEALTH CARE EDUCATION/TRAINING PROGRAM

## 2024-06-24 PROCEDURE — 84484 ASSAY OF TROPONIN QUANT: CPT | Mod: 91 | Performed by: STUDENT IN AN ORGANIZED HEALTH CARE EDUCATION/TRAINING PROGRAM

## 2024-06-24 PROCEDURE — 84100 ASSAY OF PHOSPHORUS: CPT | Performed by: STUDENT IN AN ORGANIZED HEALTH CARE EDUCATION/TRAINING PROGRAM

## 2024-06-24 PROCEDURE — 93005 ELECTROCARDIOGRAM TRACING: CPT

## 2024-06-24 PROCEDURE — 2500000004 HC RX 250 GENERAL PHARMACY W/ HCPCS (ALT 636 FOR OP/ED): Performed by: STUDENT IN AN ORGANIZED HEALTH CARE EDUCATION/TRAINING PROGRAM

## 2024-06-24 RX ORDER — POLYETHYLENE GLYCOL 3350 17 G/17G
17 POWDER, FOR SOLUTION ORAL DAILY PRN
Status: DISCONTINUED | OUTPATIENT
Start: 2024-06-24 | End: 2024-06-27 | Stop reason: HOSPADM

## 2024-06-24 RX ORDER — CEFTRIAXONE 1 G/50ML
1 INJECTION, SOLUTION INTRAVENOUS EVERY 24 HOURS
Status: DISCONTINUED | OUTPATIENT
Start: 2024-06-25 | End: 2024-06-26

## 2024-06-24 RX ORDER — ACETAMINOPHEN 160 MG/5ML
650 SOLUTION ORAL EVERY 4 HOURS PRN
Status: DISCONTINUED | OUTPATIENT
Start: 2024-06-24 | End: 2024-06-27 | Stop reason: HOSPADM

## 2024-06-24 RX ORDER — INSULIN LISPRO 100 [IU]/ML
INJECTION, SOLUTION INTRAVENOUS; SUBCUTANEOUS
COMMUNITY

## 2024-06-24 RX ORDER — DEXTROSE 50 % IN WATER (D50W) INTRAVENOUS SYRINGE
12.5
Status: DISCONTINUED | OUTPATIENT
Start: 2024-06-24 | End: 2024-06-27 | Stop reason: HOSPADM

## 2024-06-24 RX ORDER — CALCIUM CARBONATE/VITAMIN D3 600MG-5MCG
1 TABLET ORAL NIGHTLY
COMMUNITY

## 2024-06-24 RX ORDER — ACETAMINOPHEN 650 MG/1
650 SUPPOSITORY RECTAL EVERY 4 HOURS PRN
Status: DISCONTINUED | OUTPATIENT
Start: 2024-06-24 | End: 2024-06-27 | Stop reason: HOSPADM

## 2024-06-24 RX ORDER — INSULIN LISPRO 100 [IU]/ML
0-10 INJECTION, SOLUTION INTRAVENOUS; SUBCUTANEOUS
Status: DISCONTINUED | OUTPATIENT
Start: 2024-06-24 | End: 2024-06-27 | Stop reason: HOSPADM

## 2024-06-24 RX ORDER — SODIUM,POTASSIUM PHOSPHATES 280-250MG
2 POWDER IN PACKET (EA) ORAL ONCE
Status: COMPLETED | OUTPATIENT
Start: 2024-06-24 | End: 2024-06-24

## 2024-06-24 RX ORDER — ACETAMINOPHEN 325 MG/1
650 TABLET ORAL EVERY 4 HOURS PRN
Status: DISCONTINUED | OUTPATIENT
Start: 2024-06-24 | End: 2024-06-27 | Stop reason: HOSPADM

## 2024-06-24 RX ORDER — METOPROLOL TARTRATE 25 MG/1
25 TABLET, FILM COATED ORAL 2 TIMES DAILY
Status: DISCONTINUED | OUTPATIENT
Start: 2024-06-24 | End: 2024-06-27 | Stop reason: HOSPADM

## 2024-06-24 RX ORDER — DEXTROSE 50 % IN WATER (D50W) INTRAVENOUS SYRINGE
25
Status: DISCONTINUED | OUTPATIENT
Start: 2024-06-24 | End: 2024-06-27 | Stop reason: HOSPADM

## 2024-06-24 RX ORDER — INSULIN GLARGINE 100 [IU]/ML
10 INJECTION, SOLUTION SUBCUTANEOUS NIGHTLY
Status: DISCONTINUED | OUTPATIENT
Start: 2024-06-24 | End: 2024-06-27 | Stop reason: HOSPADM

## 2024-06-24 RX ORDER — MAGNESIUM SULFATE HEPTAHYDRATE 40 MG/ML
2 INJECTION, SOLUTION INTRAVENOUS ONCE
Status: COMPLETED | OUTPATIENT
Start: 2024-06-24 | End: 2024-06-24

## 2024-06-24 RX ORDER — CEFTRIAXONE 1 G/50ML
1 INJECTION, SOLUTION INTRAVENOUS ONCE
Status: COMPLETED | OUTPATIENT
Start: 2024-06-24 | End: 2024-06-24

## 2024-06-24 SDOH — SOCIAL STABILITY: SOCIAL INSECURITY: ARE THERE ANY APPARENT SIGNS OF INJURIES/BEHAVIORS THAT COULD BE RELATED TO ABUSE/NEGLECT?: NO

## 2024-06-24 SDOH — SOCIAL STABILITY: SOCIAL INSECURITY: ARE YOU OR HAVE YOU BEEN THREATENED OR ABUSED PHYSICALLY, EMOTIONALLY, OR SEXUALLY BY ANYONE?: NO

## 2024-06-24 SDOH — SOCIAL STABILITY: SOCIAL INSECURITY: DOES ANYONE TRY TO KEEP YOU FROM HAVING/CONTACTING OTHER FRIENDS OR DOING THINGS OUTSIDE YOUR HOME?: NO

## 2024-06-24 SDOH — SOCIAL STABILITY: SOCIAL INSECURITY: HAVE YOU HAD THOUGHTS OF HARMING ANYONE ELSE?: NO

## 2024-06-24 SDOH — SOCIAL STABILITY: SOCIAL INSECURITY: DO YOU FEEL ANYONE HAS EXPLOITED OR TAKEN ADVANTAGE OF YOU FINANCIALLY OR OF YOUR PERSONAL PROPERTY?: NO

## 2024-06-24 SDOH — SOCIAL STABILITY: SOCIAL INSECURITY: HAS ANYONE EVER THREATENED TO HURT YOUR FAMILY OR YOUR PETS?: NO

## 2024-06-24 SDOH — SOCIAL STABILITY: SOCIAL INSECURITY: ABUSE: ADULT

## 2024-06-24 SDOH — SOCIAL STABILITY: SOCIAL INSECURITY: DO YOU FEEL UNSAFE GOING BACK TO THE PLACE WHERE YOU ARE LIVING?: NO

## 2024-06-24 SDOH — SOCIAL STABILITY: SOCIAL INSECURITY: HAVE YOU HAD ANY THOUGHTS OF HARMING ANYONE ELSE?: NO

## 2024-06-24 ASSESSMENT — ACTIVITIES OF DAILY LIVING (ADL)
ADEQUATE_TO_COMPLETE_ADL: YES
ASSISTIVE_DEVICE: CANE;WALKER
BATHING: INDEPENDENT
TOILETING: INDEPENDENT
FEEDING YOURSELF: INDEPENDENT
PATIENT'S MEMORY ADEQUATE TO SAFELY COMPLETE DAILY ACTIVITIES?: YES
GROOMING: INDEPENDENT
WALKS IN HOME: INDEPENDENT
JUDGMENT_ADEQUATE_SAFELY_COMPLETE_DAILY_ACTIVITIES: YES
LACK_OF_TRANSPORTATION: NO
DRESSING YOURSELF: INDEPENDENT
HEARING - LEFT EAR: FUNCTIONAL
HEARING - RIGHT EAR: FUNCTIONAL

## 2024-06-24 ASSESSMENT — COGNITIVE AND FUNCTIONAL STATUS - GENERAL
DAILY ACTIVITIY SCORE: 24
MOBILITY SCORE: 22
CLIMB 3 TO 5 STEPS WITH RAILING: A LOT
PATIENT BASELINE BEDBOUND: NO

## 2024-06-24 ASSESSMENT — LIFESTYLE VARIABLES
AUDIT-C TOTAL SCORE: 0
SKIP TO QUESTIONS 9-10: 1
HOW MANY STANDARD DRINKS CONTAINING ALCOHOL DO YOU HAVE ON A TYPICAL DAY: PATIENT DOES NOT DRINK
AUDIT-C TOTAL SCORE: 0
HOW OFTEN DO YOU HAVE 6 OR MORE DRINKS ON ONE OCCASION: NEVER
HOW OFTEN DO YOU HAVE A DRINK CONTAINING ALCOHOL: NEVER

## 2024-06-24 ASSESSMENT — PATIENT HEALTH QUESTIONNAIRE - PHQ9
SUM OF ALL RESPONSES TO PHQ9 QUESTIONS 1 & 2: 0
1. LITTLE INTEREST OR PLEASURE IN DOING THINGS: NOT AT ALL
2. FEELING DOWN, DEPRESSED OR HOPELESS: NOT AT ALL

## 2024-06-24 ASSESSMENT — PAIN DESCRIPTION - PROGRESSION: CLINICAL_PROGRESSION: NOT CHANGED

## 2024-06-24 ASSESSMENT — PAIN SCALES - GENERAL
PAINLEVEL_OUTOF10: 0 - NO PAIN
PAINLEVEL_OUTOF10: 0 - NO PAIN

## 2024-06-24 ASSESSMENT — PAIN - FUNCTIONAL ASSESSMENT: PAIN_FUNCTIONAL_ASSESSMENT: 0-10

## 2024-06-24 NOTE — ED PROVIDER NOTES
HPI: The patient is a 74-year-old woman with history of A-fib and diabetes who has a history of sick sinus syndrome status post pacer last year who presents to the Emergency Department with a chief complaint of generalized fatigue for the last few days.  Patient reports dizziness that is constant but worse whenever she stands up.  She denies any focal numbness tingling or weakness that is new and denies any new aches or pains.  She reports that she feels fatigued at all times but is certainly worse when she is getting up moving around.  She went to see her cardiologist recently who told her that there was a problem with her pacemaker and the patient reports that it was adjusted but she still needs to follow-up for further care.  She is been taking all her medications as prescribed.  Denies any new chest pain or shortness of breath.  Denies any new swelling.     PAST MEDICAL HISTORY:  as per HPI  ALLERGIES:  as per HPI  MEDICATIONS:  as per HPI  FAMILY HISTORY: as per HPI  SURGICAL HISTORY: as per HPI  SOCIAL HISTORY: as per HPI     PHYSICAL EXAM:  VITAL SIGNS: Nursing notes reviewed.  GENERAL:  Alert and interactive  EYES:   Eyes track.  ENT:  Airway patent.  RESPIRATORY:  Nonlabored breathing.  CARDIOVASCULAR:  [Regular rate.] [Regular rhythm.]  GASTROINTESTINAL:  No distension.  MUSCULOSKELETAL:  No deformity.   NEUROLOGICAL:  Awake.  Tracks with eyes, PERRL, EOMI, equal sensation in V1-V3, no facial droop, sounds equal in both ears, 5/5 w/ strength shoulder raise, tongue protrudes at midline, soft palate raises symmetrically 5/5 strength in UE and LE, no deficit with light touch, normal finger to nose  SKIN:  Dry.        MEDICAL DECISION MAKING (MDM):     DIAGNOSTIC STUDIES  Labs: CBC, CMP, CK level, Agnesian level, phosphorus low, troponin, UA  Radiology: CT head     EKG 1253  Per my interpretation:  Electrocardiogram ECG  RATE:  [Normal]  RHYTHM: Paced rhythm  AXIS: [Normal]  INTERVALS: [Normal]  ST-T WAVE  CHANGES: Discordant changes  ABNORMALITIES/COMPARISON: Unchanged from most recent EKG on June 6, 2024.       REVIEW OF OLD RECORDS: Reviewed the outpatient cardiology note from 6/21/2024     SUMMARY:  The patient is admitted to the Emergency Department for evaluation of above. Complete history and physical examination was performed by me.  Patient presents with generalized fatigue and dizziness for the last few days.  Does seem to be positional but given it is constantly when she sitting down it does raise suspicion for central cause.  CT head EKG urine and blood work obtained.  I did consider stroke but the patient's dizziness has been present for more than 24 hours so she is outside the window for intervention.  Patient was kept on the monitor.  Patient has a paced rhythm right now with the pacer seems to be working in the interval but the patient did report that she was told by her cardiologist that the pacer was malfunctioning so I did attempt to reach out to work electrophysiologist, Dr. Merritt to discuss.  Patient's weakness is generalized and nonfocal point away from stroke.    Patient's blood work showed a mild HIMA, mild hypophosphatemia and mild hypomagnesemia.  Patient was given fluids and these electrolytes were replaced.  The rest of her blood work was reassuring but she was found to have a UTI so I started her on ceftriaxone.  It is possible this is contributing to her generalized fatigue.  I did discuss the case with Dr. Lees who recommended hospitalization for cardiac monitoring and further evaluation given the patient has been presenting multiple times as an outpatient but still having persistent symptoms and is at risk to fall given that she is on blood thinners and reporting significant dizziness and fatigue.  I discussed this with hospitalist the patient was admitted for further evaluation care.     DIAGNOSIS:    Generalized fatigue  UTI     DISPOSITION:    1) admission     Shorty Morrison,  MD  06/24/24 0865

## 2024-06-24 NOTE — H&P
Medical Group History and Physical  ASSESSMENT & PLAN:     Lightheadedness and near syncope  Persistent atrial fibrillation status post recent SHIELA and DCCV  Sick sinus syndrome with high degree AV block status post dual-chamber pacemaker (2023)  Long-term anticoagulation use  - Presented from home with multiple episodes of lightheadedness  - Had a recent SHIELA and DCCV on 6/6  - Outpatient follow-up on 6/21 with EP for post procedure visit reviewed as well, device check performed at that time  - While in an outpatient office visit today in [facility],  states they were told heart rate in the 40s to 80s  - Will interrogate pacemaker  - EP consulted  - Resume home medications once reconciled  - Will follow-up orthostatic vitals  - PT/OT as well    Acute cystitis  - Urinalysis mildly positive  - Ceftriaxone x 3 doses  - Follow-up urine cultures    Hypomagnesemia  Hypophosphatemia  - Replace as indicated    Type II DM  Hyperlipidemia  GERD  Essential hypertension  - Resume home medications once reconciled    VTE prophylaxis: Home apixaban    Spoke with patient's  at bedside.      ---Of note, this documentation is completed using the Dragon Dictation system (voice recognition software). There may be spelling and/or grammatical errors that were not corrected prior to final submission.---    Sylvester Buckley MD    HISTORY OF PRESENT ILLNESS:   Chief Complaint: LIghtheaded    History Of Present Illness:    Twyla Waller is a 74 y.o. female with a significant past medical history of type II DM, sinus node dysfunction with high degree AV block requiring a dual-chamber pacemaker, hypertension, hyperlipidemia with previous episodes of syncope that presented from home with chief complaints of weakness and lightheadedness.    Patient states that the symptoms began over the past few days where she been having regular lightheadedness.  She had a SHIELA with cardioversion on 6/6/2024.  She had also an outpatient office  visit with EP for 2-week checkup on .  At that time device interrogation was completed in the office and patient was referred for outpatient follow-up in 3 months.    On my exam, patient states that she has had continued lightheadedness over the past couple days.  She had been day on Saturday where she was able to walk around and do her chores.  However today she was very lightheaded when she went to get out of bed.  Denies having any recent loss of consciousness however.  Denies having chest pain, palpitations as well just lightheadedness and weakness.    ED course:  - Vitals: Temperature 97.2, HR 70, /69, RR 18 Satcher 97% on room air  - Labs: Unremarkable CBC.  Phosphorus at 2.2, magnesium at 1.48, potassium 3.9 otherwise unremarkable CMP.  - Imaging: CT head without contrast with no acute findings.     Review of systems: 10 point review of systems is otherwise negative except as mentioned above.    PAST HISTORIES:     Past Medical History:  She has a past medical history of Arrhythmia, Diabetes mellitus (Multi), Hyperlipidemia, and Hypertension.    Past Surgical History:  She has a past surgical history that includes pacemaker placement; Tonsilectomy, adenoidectomy, bilateral myringotomy and tubes; Bowel resection; Hysterectomy; Esophagus surgery;  section, classic; and XR shoulder (Left).      Social History:  She reports that she has never smoked. She has never used smokeless tobacco. She reports that she does not currently use alcohol. She reports that she does not currently use drugs.    Family History:  Family History   Problem Relation Name Age of Onset    Colon cancer Mother      Hypertension Father      Aneurysm Father      Pancreatic cancer Sister        Allergies:  Morphine, Verapamil, Calcium channel blocking agents-dihydropyridines, Latex, Penicillins, Ace inhibitors, Alendronate, Calcium channel blocking agent diltiazem analogues, Ibuprofen, Lisinopril, Metformin, Rybelsus  "[semaglutide], Sertraline, and Iodine    OBJECTIVE:     Last Recorded Vitals:  Vitals:    06/24/24 1241 06/24/24 1340   BP: 109/69 118/79   BP Location: Right arm Left arm   Patient Position: Sitting Lying   Pulse: 78 77   Resp: 18 18   Temp: 36.2 °C (97.2 °F)    TempSrc: Temporal    SpO2: 97% 95%   Weight: 71.2 kg (157 lb)    Height: 1.6 m (5' 3\")      Last I/O:  No intake/output data recorded.    Physical Exam  Constitutional:       General: She is not in acute distress.     Appearance: Normal appearance. She is not toxic-appearing.   HENT:      Head: Normocephalic and atraumatic.      Nose: Nose normal.      Mouth/Throat:      Mouth: Mucous membranes are moist.      Pharynx: Oropharynx is clear.      Comments: Dentition okay.  Eyes:      Extraocular Movements: Extraocular movements intact.      Conjunctiva/sclera: Conjunctivae normal.      Pupils: Pupils are equal, round, and reactive to light.   Cardiovascular:      Rate and Rhythm: Normal rate and regular rhythm.      Pulses: Normal pulses.      Heart sounds: Normal heart sounds.   Pulmonary:      Effort: Pulmonary effort is normal. No respiratory distress.      Breath sounds: Normal breath sounds. No wheezing.   Abdominal:      General: Bowel sounds are normal. There is no distension.      Palpations: Abdomen is soft.      Tenderness: There is no abdominal tenderness. There is no guarding.   Musculoskeletal:         General: Normal range of motion.      Cervical back: Normal range of motion and neck supple.   Neurological:      General: No focal deficit present.      Mental Status: She is alert and oriented to person, place, and time. Mental status is at baseline.   Psychiatric:         Mood and Affect: Mood normal.         Behavior: Behavior normal.         Thought Content: Thought content normal.     Scheduled Medications  cefTRIAXone, 1 g, intravenous, Once  lactated Ringer's, 500 mL, intravenous, Once  magnesium sulfate, 2 g, intravenous, Once  potassium, " sodium phosphates, 2 packet, oral, Once    PRN Medications    Continuous Medications     Outpatient Medications:  Prior to Admission medications    Medication Sig Start Date End Date Taking? Authorizing Provider   acetaminophen (Tylenol) 500 mg tablet Take 1 tablet (500 mg) by mouth every 6 hours if needed for mild pain (1 - 3).    Historical Provider, MD   albuterol 90 mcg/actuation inhaler Inhale 1 puff every 4 hours if needed for shortness of breath. 7/13/23   Historical Provider, MD   apixaban (Eliquis) 5 mg tablet Take 1 tablet (5 mg) by mouth 2 times a day. 11/10/23 11/9/24  Ofelia Camargo MD   atorvastatin (Lipitor) 20 mg tablet Take 1 tablet (20 mg) by mouth once daily at bedtime. 5/31/22   Historical Provider, MD   CALCIUM 26-VIT D3-MAGNESIUM 15 ORAL Take by mouth.    Historical Provider, MD   cholecalciferol (Vitamin D-3) 50 mcg (2,000 unit) capsule Take 1 capsule (50 mcg) by mouth once daily.    Historical Provider, MD   citalopram (CeleXA) 20 mg tablet Take 1 tablet (20 mg) by mouth once daily in the morning. 10/6/23   Historical Provider, MD   cranberry fruit concentrate (AZO CRANBERRY ORAL) Take 1 tablet by mouth early in the morning..    Historical Provider, MD   cyanocobalamin (Vitamin B-12) 1,000 mcg tablet Take 1 tablet (1,000 mcg) by mouth once daily.    Historical Provider, MD   denosumab (Prolia) 60 mg/mL syringe Inject 1 mL (60 mg) under the skin every 6 months.    Historical Provider, MD   esomeprazole (NexIUM) 20 mg DR capsule Take 1 capsule (20 mg) by mouth 2 times a day as needed. Do not open capsule.    Historical Provider, MD   fluocinolone 0.01 % cream Apply topically if needed.    Historical Provider, MD   fluticasone propion-salmeteroL (Advair Diskus) 100-50 mcg/dose diskus inhaler Inhale 1 puff 2 times a day. Rinse mouth with water after use to reduce aftertaste and incidence of candidiasis. Do not swallow.    Historical Provider, MD   furosemide (Lasix) 20 mg tablet Take 1 tablet (20  "mg) by mouth once daily as needed (Lower extremity edema).    Historical Provider, MD   gabapentin (Neurontin) 100 mg capsule Take 1 capsule (100 mg) by mouth 3 times a day as needed.    Historical Provider, MD   insulin lispro (HumaLOG) 100 unit/mL injection Inject 10 Units under the skin see administration instructions. Sliding scale    Historical Provider, MD Silvia Dejesusar U-100 Insulin 100 unit/mL (3 mL) pen Inject 22 Units under the skin once daily in the morning. 10/5/23   Historical Provider, MD   linaclotide (LINZESS ORAL) Take by mouth if needed.    Historical Provider, MD   metoprolol tartrate (Lopressor) 25 mg tablet Take 1 tablet (25 mg) by mouth 2 times a day. 5/10/24 5/10/25  PATRICIA Monterroso-CNP   pen needle 1/2\" 29G X 12mm needle every 8 hours.    Historical Provider, MD   pen needle, diabetic (BD Ultra-Fine Short Pen Needle) 31 gauge x 5/16\" needle Inject 1 each under the skin 4 times a day as needed. 5/22/23   Historical Provider, MD   polyethylene glycol (Glycolax, Miralax) 1 gram packet Take 17 g by mouth once daily. 1/28/20   Historical Provider, MD   potassium chloride CR 10 mEq ER tablet Take 1 tablet (10 mEq) by mouth 2 times a day.    Historical Provider, MD   Rybelsus 14 mg tablet tablet Take 1 tablet (14 mg) by mouth once daily. 30 MINUTES BEFORE FIRST FOOD, BEVERAGE, OR OTHER MEDICATION OF THE DAY    Historical Provider, MD   sennosides-docusate sodium (Guadalupe-Colace) 8.6-50 mg tablet Take 1 tablet by mouth once daily. 1/28/20   Historical Provider, MD   solifenacin (VESIcare) 10 mg tablet Take 1 tablet (10 mg) by mouth early in the morning.. 5/12/24   Historical Provider, MD   amLODIPine (Norvasc) 2.5 mg tablet Take 1 tablet (2.5 mg) by mouth once daily in the morning. 11/10/23 6/21/24  Ofelia Camargo MD   mirtazapine (Remeron) 15 mg tablet Take 1 tablet (15 mg) by mouth once daily at bedtime. ON HOLD  6/21/24  Historical Provider, MD   risperiDONE (RisperDAL) 0.5 mg tablet " Take 1 tablet (0.5 mg) by mouth once daily at bedtime. ON HOLD 9/25/23 6/21/24  Historical Provider, MD       LABS AND IMAGING:     Labs:  Results from last 7 days   Lab Units 06/24/24  1300   WBC AUTO x10*3/uL 11.7*   RBC AUTO x10*6/uL 4.49   HEMOGLOBIN g/dL 15.0   HEMATOCRIT % 44.6   MCV fL 99   MCH pg 33.4   MCHC g/dL 33.6   RDW % 14.5   PLATELETS AUTO x10*3/uL 259     Results from last 7 days   Lab Units 06/24/24  1300   SODIUM mmol/L 138   POTASSIUM mmol/L 3.9   CHLORIDE mmol/L 102   CO2 mmol/L 30   BUN mg/dL 31*   CREATININE mg/dL 1.19*   GLUCOSE mg/dL 187*   PROTEIN TOTAL g/dL 6.2*   CALCIUM mg/dL 9.5   BILIRUBIN TOTAL mg/dL 0.5   ALK PHOS U/L 71   AST U/L 22   ALT U/L 23     Results from last 7 days   Lab Units 06/24/24  1300   MAGNESIUM mg/dL 1.48*     Results from last 7 days   Lab Units 06/24/24  1423 06/24/24  1300   TROPHS ng/L 8 9       Imaging:  CT head wo IV contrast  Narrative: Interpreted By:  Ai Mendoza,   STUDY:  CT HEAD WO IV CONTRAST;  6/24/2024 3:09 pm      INDICATION:  Signs/Symptoms:weakness.      COMPARISON:  None.      ACCESSION NUMBER(S):  [id number]      ORDERING CLINICIAN:  PRINCE FOURNIER      TECHNIQUE:  Noncontrast axial CT scan of head was performed. Multiplanar  reconstructions      FINDINGS:  No acute intracranial hemorrhage, mass effect, midline shift, or  herniation. No evidence of hydrocephalus. Periventricular and  subcortical deep white matter hypodensity suggestive of chronic  microangiopathic change. The ventricles and sulci are unremarkable  for age. The visualized paranasal sinuses and mastoid air cells are  clear. No acute osseous abnormality of the calvarium. No destructive  bone lesion.      Impression: No acute intracranial abnormality. Consider follow-up with MRI as  warranted.          Signed by: Ai Mendoza 6/24/2024 3:45 PM  Dictation workstation:   [id number]  ECG 12 lead  AV dual-paced rhythm with prolonged AV conduction  Abnormal ECG  When  compared with ECG of 06-JUN-2024 13:50,  Vent. rate has decreased BY   6 BPM

## 2024-06-25 ENCOUNTER — APPOINTMENT (OUTPATIENT)
Dept: CARDIOLOGY | Facility: HOSPITAL | Age: 74
End: 2024-06-25
Payer: MEDICARE

## 2024-06-25 PROBLEM — I47.19 ATRIAL TACHYCARDIA (CMS-HCC): Status: ACTIVE | Noted: 2024-06-24

## 2024-06-25 PROBLEM — R53.83 FATIGUE, UNSPECIFIED TYPE: Status: ACTIVE | Noted: 2024-06-25

## 2024-06-25 LAB
ANION GAP SERPL CALC-SCNC: 10 MMOL/L (ref 10–20)
ATRIAL RATE: 81 BPM
BASOPHILS # BLD AUTO: 0.04 X10*3/UL (ref 0–0.1)
BASOPHILS NFR BLD AUTO: 0.4 %
BUN SERPL-MCNC: 26 MG/DL (ref 6–23)
CALCIUM SERPL-MCNC: 8.4 MG/DL (ref 8.6–10.3)
CHLORIDE SERPL-SCNC: 105 MMOL/L (ref 98–107)
CO2 SERPL-SCNC: 29 MMOL/L (ref 21–32)
CREAT SERPL-MCNC: 0.93 MG/DL (ref 0.5–1.05)
EGFRCR SERPLBLD CKD-EPI 2021: 64 ML/MIN/1.73M*2
EOSINOPHIL # BLD AUTO: 0.08 X10*3/UL (ref 0–0.4)
EOSINOPHIL NFR BLD AUTO: 0.8 %
ERYTHROCYTE [DISTWIDTH] IN BLOOD BY AUTOMATED COUNT: 14.6 % (ref 11.5–14.5)
GLUCOSE BLD MANUAL STRIP-MCNC: 174 MG/DL (ref 74–99)
GLUCOSE BLD MANUAL STRIP-MCNC: 215 MG/DL (ref 74–99)
GLUCOSE BLD MANUAL STRIP-MCNC: 219 MG/DL (ref 74–99)
GLUCOSE SERPL-MCNC: 120 MG/DL (ref 74–99)
HCT VFR BLD AUTO: 41.7 % (ref 36–46)
HGB BLD-MCNC: 13.9 G/DL (ref 12–16)
HOLD SPECIMEN: NORMAL
HOLD SPECIMEN: NORMAL
IMM GRANULOCYTES # BLD AUTO: 0.05 X10*3/UL (ref 0–0.5)
IMM GRANULOCYTES NFR BLD AUTO: 0.5 % (ref 0–0.9)
LYMPHOCYTES # BLD AUTO: 2.3 X10*3/UL (ref 0.8–3)
LYMPHOCYTES NFR BLD AUTO: 22.7 %
MAGNESIUM SERPL-MCNC: 1.86 MG/DL (ref 1.6–2.4)
MCH RBC QN AUTO: 33.2 PG (ref 26–34)
MCHC RBC AUTO-ENTMCNC: 33.3 G/DL (ref 32–36)
MCV RBC AUTO: 100 FL (ref 80–100)
MONOCYTES # BLD AUTO: 0.56 X10*3/UL (ref 0.05–0.8)
MONOCYTES NFR BLD AUTO: 5.5 %
NEUTROPHILS # BLD AUTO: 7.12 X10*3/UL (ref 1.6–5.5)
NEUTROPHILS NFR BLD AUTO: 70.1 %
NRBC BLD-RTO: 0 /100 WBCS (ref 0–0)
P AXIS: 61 DEGREES
P OFFSET: 107 MS
P ONSET: 71 MS
PHOSPHATE SERPL-MCNC: 3.3 MG/DL (ref 2.5–4.9)
PLATELET # BLD AUTO: 235 X10*3/UL (ref 150–450)
POTASSIUM SERPL-SCNC: 3.3 MMOL/L (ref 3.5–5.3)
PR INTERVAL: 246 MS
Q ONSET: 195 MS
QRS COUNT: 14 BEATS
QRS DURATION: 146 MS
QT INTERVAL: 432 MS
QTC CALCULATION(BAZETT): 501 MS
QTC FREDERICIA: 477 MS
R AXIS: -79 DEGREES
RBC # BLD AUTO: 4.19 X10*6/UL (ref 4–5.2)
SODIUM SERPL-SCNC: 141 MMOL/L (ref 136–145)
T AXIS: 121 DEGREES
T OFFSET: 411 MS
VENTRICULAR RATE: 81 BPM
WBC # BLD AUTO: 10.2 X10*3/UL (ref 4.4–11.3)

## 2024-06-25 PROCEDURE — 36415 COLL VENOUS BLD VENIPUNCTURE: CPT | Performed by: STUDENT IN AN ORGANIZED HEALTH CARE EDUCATION/TRAINING PROGRAM

## 2024-06-25 PROCEDURE — 93280 PM DEVICE PROGR EVAL DUAL: CPT

## 2024-06-25 PROCEDURE — 83735 ASSAY OF MAGNESIUM: CPT | Performed by: STUDENT IN AN ORGANIZED HEALTH CARE EDUCATION/TRAINING PROGRAM

## 2024-06-25 PROCEDURE — 93005 ELECTROCARDIOGRAM TRACING: CPT

## 2024-06-25 PROCEDURE — 99232 SBSQ HOSP IP/OBS MODERATE 35: CPT | Performed by: STUDENT IN AN ORGANIZED HEALTH CARE EDUCATION/TRAINING PROGRAM

## 2024-06-25 PROCEDURE — 80048 BASIC METABOLIC PNL TOTAL CA: CPT | Performed by: STUDENT IN AN ORGANIZED HEALTH CARE EDUCATION/TRAINING PROGRAM

## 2024-06-25 PROCEDURE — 82947 ASSAY GLUCOSE BLOOD QUANT: CPT

## 2024-06-25 PROCEDURE — 93010 ELECTROCARDIOGRAM REPORT: CPT | Performed by: INTERNAL MEDICINE

## 2024-06-25 PROCEDURE — 2500000004 HC RX 250 GENERAL PHARMACY W/ HCPCS (ALT 636 FOR OP/ED): Performed by: STUDENT IN AN ORGANIZED HEALTH CARE EDUCATION/TRAINING PROGRAM

## 2024-06-25 PROCEDURE — 2500000001 HC RX 250 WO HCPCS SELF ADMINISTERED DRUGS (ALT 637 FOR MEDICARE OP): Performed by: INTERNAL MEDICINE

## 2024-06-25 PROCEDURE — 2500000001 HC RX 250 WO HCPCS SELF ADMINISTERED DRUGS (ALT 637 FOR MEDICARE OP): Performed by: STUDENT IN AN ORGANIZED HEALTH CARE EDUCATION/TRAINING PROGRAM

## 2024-06-25 PROCEDURE — 93280 PM DEVICE PROGR EVAL DUAL: CPT | Performed by: INTERNAL MEDICINE

## 2024-06-25 PROCEDURE — 99222 1ST HOSP IP/OBS MODERATE 55: CPT | Performed by: INTERNAL MEDICINE

## 2024-06-25 PROCEDURE — 2500000002 HC RX 250 W HCPCS SELF ADMINISTERED DRUGS (ALT 637 FOR MEDICARE OP, ALT 636 FOR OP/ED): Performed by: STUDENT IN AN ORGANIZED HEALTH CARE EDUCATION/TRAINING PROGRAM

## 2024-06-25 PROCEDURE — 97161 PT EVAL LOW COMPLEX 20 MIN: CPT | Mod: GP

## 2024-06-25 PROCEDURE — 84100 ASSAY OF PHOSPHORUS: CPT | Performed by: STUDENT IN AN ORGANIZED HEALTH CARE EDUCATION/TRAINING PROGRAM

## 2024-06-25 PROCEDURE — 2500000002 HC RX 250 W HCPCS SELF ADMINISTERED DRUGS (ALT 637 FOR MEDICARE OP, ALT 636 FOR OP/ED): Performed by: NURSE PRACTITIONER

## 2024-06-25 PROCEDURE — 97165 OT EVAL LOW COMPLEX 30 MIN: CPT | Mod: GO

## 2024-06-25 PROCEDURE — 85025 COMPLETE CBC W/AUTO DIFF WBC: CPT | Performed by: STUDENT IN AN ORGANIZED HEALTH CARE EDUCATION/TRAINING PROGRAM

## 2024-06-25 PROCEDURE — 1200000002 HC GENERAL ROOM WITH TELEMETRY DAILY

## 2024-06-25 RX ORDER — MAGNESIUM SULFATE HEPTAHYDRATE 40 MG/ML
2 INJECTION, SOLUTION INTRAVENOUS ONCE
Status: COMPLETED | OUTPATIENT
Start: 2024-06-25 | End: 2024-06-25

## 2024-06-25 RX ORDER — CITALOPRAM 20 MG/1
20 TABLET, FILM COATED ORAL EVERY MORNING
Status: DISCONTINUED | OUTPATIENT
Start: 2024-06-25 | End: 2024-06-25

## 2024-06-25 RX ORDER — MECLIZINE HCL 12.5 MG 12.5 MG/1
25 TABLET ORAL 3 TIMES DAILY PRN
Status: DISCONTINUED | OUTPATIENT
Start: 2024-06-25 | End: 2024-06-27 | Stop reason: HOSPADM

## 2024-06-25 RX ORDER — AMIODARONE HYDROCHLORIDE 200 MG/1
200 TABLET ORAL DAILY
Status: DISCONTINUED | OUTPATIENT
Start: 2024-06-25 | End: 2024-06-27 | Stop reason: HOSPADM

## 2024-06-25 RX ORDER — POTASSIUM CHLORIDE 20 MEQ/1
40 TABLET, EXTENDED RELEASE ORAL ONCE
Status: COMPLETED | OUTPATIENT
Start: 2024-06-25 | End: 2024-06-25

## 2024-06-25 RX ORDER — ATORVASTATIN CALCIUM 20 MG/1
20 TABLET, FILM COATED ORAL NIGHTLY
Status: DISCONTINUED | OUTPATIENT
Start: 2024-06-25 | End: 2024-06-27 | Stop reason: HOSPADM

## 2024-06-25 RX ORDER — GABAPENTIN 100 MG/1
100 CAPSULE ORAL 3 TIMES DAILY
Status: DISCONTINUED | OUTPATIENT
Start: 2024-06-25 | End: 2024-06-27 | Stop reason: HOSPADM

## 2024-06-25 RX ORDER — SODIUM CHLORIDE 9 MG/ML
10 INJECTION, SOLUTION INTRAVENOUS CONTINUOUS
Status: DISCONTINUED | OUTPATIENT
Start: 2024-06-27 | End: 2024-06-27

## 2024-06-25 ASSESSMENT — COGNITIVE AND FUNCTIONAL STATUS - GENERAL
TURNING FROM BACK TO SIDE WHILE IN FLAT BAD: A LITTLE
TOILETING: A LITTLE
CLIMB 3 TO 5 STEPS WITH RAILING: A LITTLE
DRESSING REGULAR LOWER BODY CLOTHING: A LITTLE
DAILY ACTIVITIY SCORE: 23
STANDING UP FROM CHAIR USING ARMS: A LITTLE
MOVING TO AND FROM BED TO CHAIR: A LITTLE
PERSONAL GROOMING: A LITTLE
MOVING TO AND FROM BED TO CHAIR: A LITTLE
STANDING UP FROM CHAIR USING ARMS: A LITTLE
DAILY ACTIVITIY SCORE: 20
WALKING IN HOSPITAL ROOM: A LITTLE
PERSONAL GROOMING: A LITTLE
MOVING FROM LYING ON BACK TO SITTING ON SIDE OF FLAT BED WITH BEDRAILS: A LITTLE
MOBILITY SCORE: 18
TURNING FROM BACK TO SIDE WHILE IN FLAT BAD: A LITTLE
WALKING IN HOSPITAL ROOM: A LITTLE
CLIMB 3 TO 5 STEPS WITH RAILING: A LITTLE
HELP NEEDED FOR BATHING: A LITTLE
MOBILITY SCORE: 18
MOVING FROM LYING ON BACK TO SITTING ON SIDE OF FLAT BED WITH BEDRAILS: A LITTLE

## 2024-06-25 ASSESSMENT — PAIN SCALES - GENERAL
PAINLEVEL_OUTOF10: 0 - NO PAIN

## 2024-06-25 ASSESSMENT — PAIN - FUNCTIONAL ASSESSMENT
PAIN_FUNCTIONAL_ASSESSMENT: 0-10

## 2024-06-25 ASSESSMENT — ACTIVITIES OF DAILY LIVING (ADL): BATHING_ASSISTANCE: STAND BY

## 2024-06-25 ASSESSMENT — ENCOUNTER SYMPTOMS: LIGHT-HEADEDNESS: 1

## 2024-06-25 NOTE — PROGRESS NOTES
06/25/24 1147   Discharge Planning   Living Arrangements Spouse/significant other   Support Systems Spouse/significant other;Family members;Children   Assistance Needed independent in adls and shares iadls w/ spouse. does not drive, spouse drives. manages own meds, no barriers. pcp is at [facility] in [place]   Type of Residence Private residence   Number of Stairs to Enter Residence 6  (HAs elevator attached ot the porch)   Home or Post Acute Services In home services   Type of Home Care Services Home OT;Home PT;Home nursing visits   Patient expects to be discharged to: home w/ hhc given list     Pt is from [facility]. follows w/ dr rod here at . Recently had a SHIELA and DCCV on 6/6. PCP is in [place]. Has hhc needs. Agreeable to hhc. Has a ww and cane at home. Realizes she needs to use the WW now. Pd home w/ HHC. Pt given CMS list for preference. Pd Thursday or Friday. Requested external hhc orders.

## 2024-06-25 NOTE — CONSULTS
Inpatient consult to Cardiology  Consult performed by: FRANTZ Fabian  Consult ordered by: Sylvester Buckley MD  Reason for consult: atrial tachycardia            Cardiology Consult Note  Patient: Twyla Waller  Unit/Bed: 918/918-B  YOB: 1950  MRN: [id number]  Acct: [id number]   Admitting Diagnosis: Cystitis [N30.90]  Lightheaded [R42]  Pacemaker [Z95.0]  Near syncope [R55]  Fatigue, unspecified type [R53.83]  Date:  6/24/2024  Hospital Day: 0  Attending: Sylvester Buckley MD    Consultant: Dr. Ofelia Camargo  / Kati Casanova CNP  Primary Cardiologist: Dr. Ofelia Camargo      Complaint:  Chief Complaint   Patient presents with    Dizziness     Had issue with pacemaker in cardio office Friday. Had appointment today and BP was found low and HR variable 40-80        History of Present Illness:  74-year-old female admitted to [facility] with complaints lightheadedness that have been ongoing for the past few days.  Patient was found to be in atrial tachycardia in the emergency room with heart rates 60 to 120 bpm.  She was admitted to [facility] medical team for further management.  Patient underwent successful SHIELA guided cardioversion on 6/6/2024.  She had her outpatient follow-up on 6/21/2024 and at that time was found to have recurrent atrial tachycardia.  It was recommended that she undergo a repeat cardioversion as outpatient.  At this time patient is still complaining of intermittent dizziness and lightheadedness.  Potassium level today was noted to be 3.3.  On telemetry patient is atrial tachycardia with heart rate 70 to 120 bpm.  Device interrogation shows patient is in atrial tachycardia.  Potassium was supplemented.  Patient has been maintained on metoprolol and Eliquis.  Urinalysis was mildly positive and she was started on ceftriaxone x 3 doses.  Past medical history includes persistent atrial fibrillation/tachycardia status post recent  cardioversion on beta-blockade and anticoagulation with Eliquis, sick sinus syndrome status post dual-chamber permanent pacemaker implant with [employer] medical device, type 2 diabetes mellitus, hyperlipidemia, GERD, normal LVEF 55 to 60% per transesophageal echocardiogram 2024, left atrial enlargement, valvular heart disease with moderate MR, moderate TR, and mild aortic valve regurgitation, and hypertension.        Allergies:  Allergies   Allergen Reactions    Morphine Other     Mental Status Change    Verapamil Unknown    Calcium Channel Blocking Agents-Dihydropyridines Other     irregular heart rate    Latex Itching and Rash    Penicillins Rash    Ace Inhibitors Cough    Alendronate Unknown    Calcium Channel Blocking Agent Diltiazem Analogues Itching    Ibuprofen Unknown    Lisinopril Cough    Metformin Diarrhea    Rybelsus [Semaglutide] Diarrhea    Sertraline Unknown    Iodine Rash        PMHx:  Past Medical History:   Diagnosis Date    Arrhythmia     A-fib    Diabetes mellitus (Multi)     Hyperlipidemia     Hypertension        PSHx:  Past Surgical History:   Procedure Laterality Date    BOWEL RESECTION       SECTION, CLASSIC      ESOPHAGUS SURGERY      HYSTERECTOMY      PACEMAKER PLACEMENT      SHOULDER Left     TONSILECTOMY, ADENOIDECTOMY, BILATERAL MYRINGOTOMY AND TUBES         Social Hx:  Social History     Socioeconomic History    Marital status:      Spouse name: Not on file    Number of children: Not on file    Years of education: Not on file    Highest education level: Not on file   Occupational History    Not on file   Tobacco Use    Smoking status: Never    Smokeless tobacco: Never   Vaping Use    Vaping status: Never Used   Substance and Sexual Activity    Alcohol use: Not Currently    Drug use: Not Currently    Sexual activity: Defer   Other Topics Concern    Not on file   Social History Narrative    Not on file     Social Determinants of Health     Financial Resource Strain:  Low Risk  (6/24/2024)    Overall Financial Resource Strain (CARDIA)     Difficulty of Paying Living Expenses: Not hard at all   Food Insecurity: Not on file   Transportation Needs: No Transportation Needs (6/24/2024)    PRAPARE - Transportation     Lack of Transportation (Medical): No     Lack of Transportation (Non-Medical): No   Physical Activity: Not on file   Stress: Not on file   Social Connections: Not on file   Intimate Partner Violence: Not on file   Housing Stability: Low Risk  (6/24/2024)    Housing Stability Vital Sign     Unable to Pay for Housing in the Last Year: No     Number of Places Lived in the Last Year: 1     Unstable Housing in the Last Year: No       Family Hx:  Family History   Problem Relation Name Age of Onset    Colon cancer Mother      Hypertension Father      Aneurysm Father      Pancreatic cancer Sister         Review of Systems:   Review of Systems   Neurological:  Positive for light-headedness.         Physical Examination:    Visit Vitals  /70 (Patient Position: Standing)   Pulse 89   Temp 36.2 °C (97.2 °F) (Temporal)   Resp 18      Physical Exam  Constitutional:       Appearance: Normal appearance.   HENT:      Head: Normocephalic and atraumatic.      Nose: Nose normal.      Mouth/Throat:      Mouth: Mucous membranes are moist.   Eyes:      Conjunctiva/sclera: Conjunctivae normal.   Cardiovascular:      Rate and Rhythm: Normal rate. Rhythm irregular.      Pulses: Normal pulses.      Heart sounds: Normal heart sounds.      Comments: Left prepectoral permanent pacemaker site well-healed  Pulmonary:      Effort: Pulmonary effort is normal.   Abdominal:      General: Abdomen is flat.      Palpations: Abdomen is soft.   Musculoskeletal:         General: Normal range of motion.   Skin:     General: Skin is warm and dry.   Neurological:      General: No focal deficit present.      Mental Status: She is alert and oriented to person, place, and time.   Psychiatric:         Mood and  Affect: Mood normal.         Behavior: Behavior normal.         LABS:  Results for orders placed or performed during the hospital encounter of 06/24/24 (from the past 96 hour(s))   ECG 12 lead   Result Value Ref Range    Ventricular Rate 81 BPM    Atrial Rate 81 BPM    MN Interval 246 ms    QRS Duration 146 ms    QT Interval 432 ms    QTC Calculation(Bazett) 501 ms    P Axis 61 degrees    R Axis -79 degrees    T Axis 121 degrees    QRS Count 14 beats    Q Onset 195 ms    P Onset 71 ms    P Offset 107 ms    T Offset 411 ms    QTC Fredericia 477 ms   CBC and Auto Differential   Result Value Ref Range    WBC 11.7 (H) 4.4 - 11.3 x10*3/uL    nRBC 0.0 0.0 - 0.0 /100 WBCs    RBC 4.49 4.00 - 5.20 x10*6/uL    Hemoglobin 15.0 12.0 - 16.0 g/dL    Hematocrit 44.6 36.0 - 46.0 %    MCV 99 80 - 100 fL    MCH 33.4 26.0 - 34.0 pg    MCHC 33.6 32.0 - 36.0 g/dL    RDW 14.5 11.5 - 14.5 %    Platelets 259 150 - 450 x10*3/uL    Neutrophils % 77.9 40.0 - 80.0 %    Immature Granulocytes %, Automated 0.4 0.0 - 0.9 %    Lymphocytes % 16.0 13.0 - 44.0 %    Monocytes % 5.1 2.0 - 10.0 %    Eosinophils % 0.3 0.0 - 6.0 %    Basophils % 0.3 0.0 - 2.0 %    Neutrophils Absolute 9.06 (H) 1.60 - 5.50 x10*3/uL    Immature Granulocytes Absolute, Automated 0.05 0.00 - 0.50 x10*3/uL    Lymphocytes Absolute 1.87 0.80 - 3.00 x10*3/uL    Monocytes Absolute 0.60 0.05 - 0.80 x10*3/uL    Eosinophils Absolute 0.04 0.00 - 0.40 x10*3/uL    Basophils Absolute 0.04 0.00 - 0.10 x10*3/uL   Comprehensive Metabolic Panel   Result Value Ref Range    Glucose 187 (H) 74 - 99 mg/dL    Sodium 138 136 - 145 mmol/L    Potassium 3.9 3.5 - 5.3 mmol/L    Chloride 102 98 - 107 mmol/L    Bicarbonate 30 21 - 32 mmol/L    Anion Gap 10 10 - 20 mmol/L    Urea Nitrogen 31 (H) 6 - 23 mg/dL    Creatinine 1.19 (H) 0.50 - 1.05 mg/dL    eGFR 48 (L) >60 mL/min/1.73m*2    Calcium 9.5 8.6 - 10.3 mg/dL    Albumin 3.5 3.4 - 5.0 g/dL    Alkaline Phosphatase 71 33 - 136 U/L    Total Protein 6.2 (L)  6.4 - 8.2 g/dL    AST 22 9 - 39 U/L    Bilirubin, Total 0.5 0.0 - 1.2 mg/dL    ALT 23 7 - 45 U/L   Magnesium   Result Value Ref Range    Magnesium 1.48 (L) 1.60 - 2.40 mg/dL   Phosphorus   Result Value Ref Range    Phosphorus 2.2 (L) 2.5 - 4.9 mg/dL   Creatine Kinase   Result Value Ref Range    Creatine Kinase 15 0 - 215 U/L   Troponin I, High Sensitivity, Initial   Result Value Ref Range    Troponin I, High Sensitivity 9 0 - 13 ng/L   Troponin, High Sensitivity, 1 Hour   Result Value Ref Range    Troponin I, High Sensitivity 8 0 - 13 ng/L   Urinalysis with Reflex Microscopic   Result Value Ref Range    Color, Urine Light-Yellow Light-Yellow, Yellow, Dark-Yellow    Appearance, Urine Clear Clear    Specific Gravity, Urine 1.021 1.005 - 1.035    pH, Urine 6.0 5.0, 5.5, 6.0, 6.5, 7.0, 7.5, 8.0    Protein, Urine NEGATIVE NEGATIVE, 10 (TRACE), 20 (TRACE) mg/dL    Glucose, Urine Normal Normal mg/dL    Blood, Urine NEGATIVE NEGATIVE    Ketones, Urine NEGATIVE NEGATIVE mg/dL    Bilirubin, Urine NEGATIVE NEGATIVE    Urobilinogen, Urine Normal Normal mg/dL    Nitrite, Urine NEGATIVE NEGATIVE    Leukocyte Esterase, Urine 250 Alejandra/µL (A) NEGATIVE   Urine Gray Tube   Result Value Ref Range    Extra Tube Hold for add-ons.    Microscopic Only, Urine   Result Value Ref Range    WBC, Urine 21-50 (A) 1-5, NONE /HPF    RBC, Urine 3-5 NONE, 1-2, 3-5 /HPF    Squamous Epithelial Cells, Urine 1-9 (SPARSE) Reference range not established. /HPF    Bacteria, Urine 1+ (A) NONE SEEN /HPF   POCT GLUCOSE   Result Value Ref Range    POCT Glucose 279 (H) 74 - 99 mg/dL   CBC and Auto Differential   Result Value Ref Range    WBC 10.2 4.4 - 11.3 x10*3/uL    nRBC 0.0 0.0 - 0.0 /100 WBCs    RBC 4.19 4.00 - 5.20 x10*6/uL    Hemoglobin 13.9 12.0 - 16.0 g/dL    Hematocrit 41.7 36.0 - 46.0 %     80 - 100 fL    MCH 33.2 26.0 - 34.0 pg    MCHC 33.3 32.0 - 36.0 g/dL    RDW 14.6 (H) 11.5 - 14.5 %    Platelets 235 150 - 450 x10*3/uL    Neutrophils % 70.1  40.0 - 80.0 %    Immature Granulocytes %, Automated 0.5 0.0 - 0.9 %    Lymphocytes % 22.7 13.0 - 44.0 %    Monocytes % 5.5 2.0 - 10.0 %    Eosinophils % 0.8 0.0 - 6.0 %    Basophils % 0.4 0.0 - 2.0 %    Neutrophils Absolute 7.12 (H) 1.60 - 5.50 x10*3/uL    Immature Granulocytes Absolute, Automated 0.05 0.00 - 0.50 x10*3/uL    Lymphocytes Absolute 2.30 0.80 - 3.00 x10*3/uL    Monocytes Absolute 0.56 0.05 - 0.80 x10*3/uL    Eosinophils Absolute 0.08 0.00 - 0.40 x10*3/uL    Basophils Absolute 0.04 0.00 - 0.10 x10*3/uL   Basic Metabolic Panel   Result Value Ref Range    Glucose 120 (H) 74 - 99 mg/dL    Sodium 141 136 - 145 mmol/L    Potassium 3.3 (L) 3.5 - 5.3 mmol/L    Chloride 105 98 - 107 mmol/L    Bicarbonate 29 21 - 32 mmol/L    Anion Gap 10 10 - 20 mmol/L    Urea Nitrogen 26 (H) 6 - 23 mg/dL    Creatinine 0.93 0.50 - 1.05 mg/dL    eGFR 64 >60 mL/min/1.73m*2    Calcium 8.4 (L) 8.6 - 10.3 mg/dL   Magnesium   Result Value Ref Range    Magnesium 1.86 1.60 - 2.40 mg/dL   Phosphorus   Result Value Ref Range    Phosphorus 3.3 2.5 - 4.9 mg/dL   SST TOP   Result Value Ref Range    Extra Tube Hold for add-ons.    ECG 12 Lead   Result Value Ref Range    Ventricular Rate 84 BPM    Atrial Rate 84 BPM    MT Interval 256 ms    QRS Duration 158 ms    QT Interval 444 ms    QTC Calculation(Bazett) 524 ms    P Axis 48 degrees    R Axis -85 degrees    T Axis 117 degrees    QRS Count 14 beats    Q Onset 193 ms    P Onset 89 ms    P Offset 112 ms    T Offset 415 ms    QTC Fredericia 496 ms   POCT GLUCOSE   Result Value Ref Range    POCT Glucose 215 (H) 74 - 99 mg/dL          Current Medications:  Scheduled medications  apixaban, 5 mg, oral, BID  atorvastatin, 20 mg, oral, Nightly  cefTRIAXone, 1 g, intravenous, q24h  citalopram, 20 mg, oral, q AM  gabapentin, 100 mg, oral, TID  insulin glargine, 10 Units, subcutaneous, Nightly  insulin lispro, 0-10 Units, subcutaneous, Before meals & nightly  magnesium sulfate, 2 g, intravenous,  Once  metoprolol tartrate, 25 mg, oral, BID      Continuous medications     PRN medications  PRN medications: acetaminophen **OR** acetaminophen **OR** acetaminophen, dextrose, dextrose, glucagon, glucagon, meclizine, polyethylene glycol     CT head wo IV contrast    Result Date: 10/18/2023  Interpreted By:  Roshan Bedoya, STUDY: CT HEAD WO IV CONTRAST;  10/18/2023 7:02 am   INDICATION: Signs/Symptoms:AMS.   COMPARISON: CT Brain, 4 March 2020   ACCESSION NUMBER(S): [id number]   ORDERING CLINICIAN: ASHLEIGH PICKETT   TECHNIQUE: CT of the brain from the skull vertex to the skull base, without intravenous contrast   FINDINGS: ACUTE INTRA-AXIAL HEMORRHAGE:  Negative   ACUTE EXTRA-AXIAL/SUBDURAL HEMORRHAGE:  Negative   ACUTE INTRACRANIAL MASS EFFECT:  Negative   CT EVIDENCE OF ACUTE / SUBACUTE TERRITORIAL ISCHEMIA:  Negative   VENTRICLES:  Unchanged diffuse dilation. No acute obstructive hydrocephalus by CT   OTHER BRAIN FINDINGS:  No significant interval change to the CT appearance of the brain including the degree of atrophy and deep white matter changes from chronic microvascular disease and encephalomalacia in the left occipital lobe from old infarct and another in or near the left thalamus   INCLUDED PARANASAL SINUSES: All clear   INCLUDED MASTOID AIR CELLS: All clear   SKULL:  No lytic or blastic lesion   EXTRACRANIAL SOFT TISSUES:  Scalp and occular globes grossly normal by CT       NO ACUTE INTRACRANIAL PROCESS   NO SIGNIFICANT INTERVAL CHANGE TO THE CT APPEARANCE OF THE BRAIN WHEN COMPARED TO 4 MARCH 2020   MACRO: None   Signed by: Roshan Bedoya 10/18/2023 7:59 AM Dictation workstation:   [id number]    CT abdomen pelvis w IV contrast    Result Date: 10/18/2023  Interpreted By:  Delmi Cintron, STUDY: CT ABDOMEN PELVIS W IV CONTRAST;  10/18/2023 12:46 am   INDICATION: Signs/Symptoms:vomiting.   COMPARISON: None.   ACCESSION NUMBER(S): [id number]   ORDERING CLINICIAN: PRASHANT BRIAN   TECHNIQUE: Axial CT images of  the abdomen and pelvis with coronal and sagittal reconstructed images obtained after intravenous administration of contrast   FINDINGS: LOWER CHEST: Mild bibasilar dependent atelectasis. Pacemaker/AICD leads noted the right atrium and right ventricle. Mitral annulus calcifications noted. BONES: No acute osseous abnormality. Mild multilevel degenerative disc changes. ABDOMINAL WALL: Within normal limits.   ABDOMEN:   LIVER: Within normal limits. BILE DUCTS: No biliary dilatation. GALLBLADDER: No calcified gallstones. No pericholecystic inflammatory changes. PANCREAS: Within normal limits. SPLEEN: Within normal limits. ADRENALS: Within normal limits. KIDNEYS and URETERS: Partial staghorn calculus versus numerous layering calculi in the right kidney. Mild right hydronephrosis. Mild right urothelial thickening, particularly in the distal ureter. No calculus along the course of the ureter. No parenchymal abnormality to suggest pyelonephritis. No hydronephrosis on the left.     VESSELS: No aortic aneurysm. RETROPERITONEUM: No pathologically enlarged retroperitoneal lymph nodes.   PELVIS:   REPRODUCTIVE ORGANS: No pelvic masses. BLADDER: Diffuse urinary bladder wall thickening and mucosal hyperenhancement.   BOWEL: The stomach is moderately distended. No dilated small bowel. Large amount of stool in the rectum. There is significant narrowing of the ascending colon and cecum, with the small bowel loops extending lateral to the cecum and ascending colon (series 301, image 128). The appendix is not seen with certainty. There are no pericecal inflammatory changes to suggest acute appendicitis. PERITONEUM: No ascites or free air, no fluid collection.       Findings suspicious for cystitis with possible right ascending UTI/pyelitis.   Large amount of stool in the rectum suggesting fecal impaction.   Suspected internal pericecal hernia. No evidence of bowel obstruction.   MACRO: None   Signed by: Delmi Cintron 10/18/2023 1:42 AM  Dictation workstation:   [id number]    XR chest 1 view    Result Date: 10/18/2023  Interpreted By:  Elvis Martinez, STUDY: XR CHEST 1 VIEW;  10/17/2023 11:16 pm   INDICATION: Signs/Symptoms:nausea and vomiting.   COMPARISON: None.   ACCESSION NUMBER(S): [id number]   ORDERING CLINICIAN: PRASHANT BRIAN   FINDINGS: Dual lead left-sided pacemaker.   The cardiomediastinal silhouette and pulmonary vasculature are within normal limits. No consolidation, pleural effusion or pneumothorax.   Chronic right mid clavicle fracture.       No acute cardiopulmonary process.     MACRO: None.   Signed by: Elvis Martinez 10/18/2023 12:15 AM Dictation workstation:   [id number]     .No echocardiogram results found for the past 14 days     Encounter Date: 06/24/24   ECG 12 Lead   Result Value    Ventricular Rate 84    Atrial Rate 84    CO Interval 256    QRS Duration 158    QT Interval 444    QTC Calculation(Bazett) 524    P Axis 48    R Axis -85    T Axis 117    QRS Count 14    Q Onset 193    P Onset 89    P Offset 112    T Offset 415    QTC Fredericia 496    Narrative    AV dual-paced rhythm with prolonged AV conduction  Abnormal ECG  When compared with ECG of 24-JUN-2024 12:53, (unconfirmed)  Vent. rate has increased BY   3 BPM            I/O:    Intake/Output Summary (Last 24 hours) at 6/25/2024 1117  Last data filed at 6/25/2024 1000  Gross per 24 hour   Intake 50 ml   Output 1325 ml   Net -1275 ml        Assessment:    Patient Active Problem List   Diagnosis    Abnormal laboratory test result    Age-related osteoporosis without current pathological fracture    Benign essential hypertension    BPPV (benign paroxysmal positional vertigo)    Cervical disc disease    Primary osteoarthritis of cervical spine    Change in bowel habits    Chest pain    Dependence on other enabling machines and devices    Diastolic heart failure (Multi)    Dizziness    Dysphagia    Fibromyalgia muscle pain    Fracture of humerus    Hyperlipidemia     Hypertensive heart and chronic kidney disease with heart failure and stage 1 through stage 4 chronic kidney disease, or unspecified chronic kidney disease (Multi)    Hypoproteinemia (Multi)    Hypoxia    Iron deficiency anemia secondary to inadequate dietary iron intake    Irritable bowel syndrome with constipation    Localized osteoarthritis of knees, bilateral    Vitamin D deficiency    Hypertensive disorder    Venous insufficiency of both lower extremities    Type 2 diabetes mellitus (Multi)    Stage 3a chronic kidney disease (Multi)    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)    Renal calculi    PTSD (post-traumatic stress disorder)    Paranoid state, simple (Multi)    Pacemaker    Other obesity due to excess calories    Osteoporosis    Osteoarthritis of knee    Obstructive sleep apnea (adult) (pediatric)    Mixed stress and urge urinary incontinence    Migraine without aura    Lung nodule    Localized primary osteoarthritis of right lower leg    OAB (overactive bladder)    Low compliance bladder    AV block    Chronotropic incompetence    Depression    Dyspnea    High-grade atrioventricular block    Near syncope    Orthostatic hypotension    Overweight with body mass index (BMI) of 29 to 29.9 in adult    Sinus node dysfunction (Multi)    Encounter for medication review and counseling    Encounter to discuss test results    Never smoked any substance    Lightheaded   Clinical impression:  1.  Recurrent atrial tachycardia status post recent cardioversion 6/6/2024  2.  Sinus node dysfunction with remote dual-chamber permanent pacemaker implant  3.  Hypertension  4.  Diabetes mellitus  5.  Abnormal urinalysis currently on IV antibiotics    Plan:  Begin amiodarone 200 mg daily  Continue metoprolol to tartrate 25 mg twice daily  Continue anticoagulation with Eliquis  Daily labs and EKG  Continue to monitor on telemetry  If remains in atrial fibrillation we will plan for cardioversion with Dr. Camargo on  Thursday, 6/27/2024      Electronically signed by FRANTZ Fabian on 6/25/2024 at 11:17 AM       I have personally reviewed the data.    In addition,  The patient feels tired today.    I spoke with ER yesterday.  Given recurrent atrial tachycardia recommended evaluation and consideration of antiarrhythmic medication    Exam  VS reviewed.      Personally reviewed ECG and tele    Tele  Atrial tachycardia    Impression  Persistent atrial tachycardia  Recurred after cardioversion  Start amiodarone  Continue monitoring  If remains in AT later this week, then plan for CVN  Greater than 50% visit spent to discuss arrhythmia, treatment options, and management plan by EP service.

## 2024-06-25 NOTE — PROGRESS NOTES
Physical Therapy    Physical Therapy Evaluation    Patient Name: Twyla Waller  MRN: [id number]  Today's Date: 6/25/2024   Time Calculation  Start Time: 0859  Stop Time: 0915  Time Calculation (min): 16 min  918/918-B    Assessment/Plan   PT Assessment  PT Assessment Results: Decreased strength, Decreased endurance, Impaired balance, Decreased mobility, Decreased coordination  Rehab Prognosis: Good  Evaluation/Treatment Tolerance: Patient limited by fatigue  End of Session Communication: Bedside nurse  Assessment Comment: pt with decreased mobility /gait , strength balance endurance . pt to benefit from skilled PT to address deficits and improve functional mobility .  End of Session Patient Position: Up in chair, Alarm on  IP OR SWING BED PT PLAN  Inpatient or Swing Bed: Inpatient  PT Plan  Treatment/Interventions: Bed mobility, Transfer training, Gait training, Stair training, Balance training, Strengthening, Endurance training, Therapeutic exercise, Therapeutic activity  PT Plan: Ongoing PT  PT Frequency: 3 times per week  PT Discharge Recommendations: Low intensity level of continued care  PT Recommended Transfer Status: Assist x1, Assistive device  PT - OK to Discharge: Yes (once medically ready for discharge to next level of care.)    Subjective     Current Problem:  1. Fatigue, unspecified type        2. Cystitis        3. Pacemaker  Cardiac Device Check - Inpatient    Cardiac Device Check - Inpatient      4. Near syncope  Cardiac Device Check - Inpatient    Cardiac Device Check - Inpatient        Patient Active Problem List   Diagnosis    Abnormal laboratory test result    Age-related osteoporosis without current pathological fracture    Benign essential hypertension    BPPV (benign paroxysmal positional vertigo)    Cervical disc disease    Primary osteoarthritis of cervical spine    Change in bowel habits    Chest pain    Dependence on other enabling machines and devices    Diastolic heart failure (Multi)     Dizziness    Dysphagia    Fibromyalgia muscle pain    Fracture of humerus    Hyperlipidemia    Hypertensive heart and chronic kidney disease with heart failure and stage 1 through stage 4 chronic kidney disease, or unspecified chronic kidney disease (Multi)    Hypoproteinemia (Multi)    Hypoxia    Iron deficiency anemia secondary to inadequate dietary iron intake    Irritable bowel syndrome with constipation    Localized osteoarthritis of knees, bilateral    Vitamin D deficiency    Hypertensive disorder    Venous insufficiency of both lower extremities    Type 2 diabetes mellitus (Multi)    Stage 3a chronic kidney disease (Multi)    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)    Renal calculi    PTSD (post-traumatic stress disorder)    Paranoid state, simple (Multi)    Pacemaker    Other obesity due to excess calories    Osteoporosis    Osteoarthritis of knee    Obstructive sleep apnea (adult) (pediatric)    Mixed stress and urge urinary incontinence    Migraine without aura    Lung nodule    Localized primary osteoarthritis of right lower leg    OAB (overactive bladder)    Low compliance bladder    AV block    Chronotropic incompetence    Depression    Dyspnea    High-grade atrioventricular block    Near syncope    Orthostatic hypotension    Overweight with body mass index (BMI) of 29 to 29.9 in adult    Sinus node dysfunction (Multi)    Encounter for medication review and counseling    Encounter to discuss test results    Never smoked any substance    Lightheaded    Fatigue, unspecified type       General Visit Information:  General  Reason for Referral: weakness/impaired mobility  Referred By: OT/PT Marcio 6/24  Past Medical History Relevant to Rehab: HTN, Afib, GERD, Dm2, PPM, HLD  Prior to Session Communication: Bedside nurse (cleared to see for therapy eval.)  Patient Position Received: Bed, 3 rail up, Alarm on (pt has IV , tele purwick.)  General Comment: pt is a 75 yo female came to the hospital on 6/24 with  reports of feeling light headed. dx: syncope, afib acute cystitis .  test/labs : UTI + , CT head neg,  EP consult to check PPM.    Home Living:  Home Living  Home Living Comments: pt lives at home with her   2 story home bed and bath on 1st level . pt has lift to get in home from outside.  pt has walk in shower with seat and grab bars.    Prior Level of Function:  Prior Function Per Pt/Caregiver Report  Prior Function Comments: per pt IND with mobility adls and iadls.  1 fall in last 3 months . doesnt drive .  has a QC, FWW and rolllator at home.    Precautions:  Precautions  Medical Precautions: Fall precautions  Objective     Pain:  Pain Assessment  Pain Assessment: 0-10  0-10 (Numeric) Pain Score: 0 - No pain    Cognition:  Cognition  Overall Cognitive Status: Within Functional Limits    General Assessments:      Activity Tolerance  Endurance: Tolerates less than 10 min exercise with changes in vital signs     Strength  Strength Comments: BLE 4/5    Dynamic Sitting Balance  Dynamic Sitting-Comments: fair +  Dynamic Standing Balance  Dynamic Standing-Comments: fair    Functional Assessments:     Bed Mobility  Bed Mobility: Yes  Bed Mobility 1  Bed Mobility 1: Supine to sitting, Scooting  Level of Assistance 1: Contact guard  Bed Mobility Comments 1: CGA to EOB  Transfers  Transfer: Yes  Transfer 1  Technique 1: Sit to stand, Stand to sit  Transfer Device 1: Walker (FWW)  Transfer Level of Assistance 1: Contact guard  Trials/Comments 1: cues to push up from bed.  Ambulation/Gait Training  Ambulation/Gait Training Performed: Yes  Ambulation/Gait Training 1  Surface 1: Level tile  Device 1: Rolling walker  Assistance 1: Contact guard  Quality of Gait 1: Inconsistent stride length, Decreased step length (slow gait speed.)  Comments/Distance (ft) 1: 30ft with cga FWW . ed on benefit of using her walker at home for safety.    Extremity/Trunk Assessments:  RLE   RLE : Within Functional Limits  LLE   LLE : Within  Functional Limits    Outcome Measures:     [facility] Basic Mobility  Turning from your back to your side while in a flat bed without using bedrails: A little  Moving from lying on your back to sitting on the side of a flat bed without using bedrails: A little  Moving to and from bed to chair (including a wheelchair): A little  Standing up from a chair using your arms (e.g. wheelchair or bedside chair): A little  To walk in hospital room: A little  Climbing 3-5 steps with railing: A little  Basic Mobility - Total Score: 18  Goals:  Encounter Problems       Encounter Problems (Active)       PT Problem       Pt will demonstrate mod I  with bed mobility to edge of bed.         Start:  06/25/24    Expected End:  07/09/24            Pt will demonstrate mod I  with sit to stand/chair transfers with FWW.         Start:  06/25/24    Expected End:  07/09/24            Pt will ambulate 100 feet with FWW mod I .         Start:  06/25/24    Expected End:  07/09/24            Pt to demo improved BLE strength by being able to complete supine/seated thera ex 2x20 BLEs with 4 or less rest breaks .         Start:  06/25/24    Expected End:  07/09/24               Pain - Adult            Education Documentation  Mobility Training, taught by Torey Mark, PT at 6/25/2024  1:00 PM.  Learner: Patient  Readiness: Acceptance  Method: Explanation  Response: Verbalizes Understanding    Education Comments  No comments found.

## 2024-06-25 NOTE — PROGRESS NOTES
Medical Group Progress Note  ASSESSMENT & PLAN:     Lightheadedness and near syncope  Persistent atrial fibrillation status post recent SHIELA and DCCV  Sick sinus syndrome with high degree AV block status post dual-chamber pacemaker (2023)  Long-term anticoagulation use  - See H&P for more recent information regarding admission   - Pacemaker interrogation reviewed  - EP following  - Plan for potentially starting amiodarone as well as cardioversion for 6/27  - Continuing apixaban     Acute cystitis  - Urinalysis mildly positive  - Ceftriaxone x 3 doses  - Follow-up urine cultures     Hypomagnesemia - resolved  Hypophosphatemia - resolved  Hypokalemia  - Replace as indicated     Type II DM  Hyperlipidemia  GERD  Essential hypertension  - Resume home medications once reconciled     VTE prophylaxis: Home apixaban    Disposition/Daily update: Seen by EP today, potentially will start amiodarone.  Plan for cardioversion on 6/27.  Ortho is reviewed, will continue daily.  Discharge planning after cardioversion on 6/27.      ---Of note, this documentation is completed using the Dragon Dictation system (voice recognition software). There may be spelling and/or grammatical errors that were not corrected prior to final submission.---    Sylvester Buckley MD    SUBJECTIVE     Patient was seen and examined bedside this morning.  Had no acute events overnight previous morning had episodes of lightheadedness, saw patient in the morning as well.  Patient states that she will be staying until Thursday for cardioversion per what EP told her.    OBJECTIVE:     Last Recorded Vitals:  Vitals:    06/25/24 0801 06/25/24 1108 06/25/24 1110 06/25/24 1114   BP: (!) 140/94 115/80 110/76 113/70   BP Location: Right arm Right arm     Patient Position: Sitting Lying 1 minute standing 3 minute standing   Pulse: (!) 118 83 84 89   Resp:       Temp:       TempSrc:       SpO2: 92%  97% 97%   Weight:       Height:         Last I/O:  I/O last 3  completed shifts:  In: 50 (0.7 mL/kg) [P.O.:50]  Out: 100 (1.4 mL/kg) [Urine:100 (0 mL/kg/hr)]  Weight: 73.6 kg     Physical Exam  Constitutional:       General: She is not in acute distress.     Appearance: Normal appearance. She is not toxic-appearing.   HENT:      Head: Normocephalic and atraumatic.   Eyes:      Extraocular Movements: Extraocular movements intact.      Conjunctiva/sclera: Conjunctivae normal.   Cardiovascular:      Rate and Rhythm: Normal rate. Rhythm irregular.      Pulses: Normal pulses.      Heart sounds: Normal heart sounds.   Pulmonary:      Effort: Pulmonary effort is normal. No respiratory distress.      Breath sounds: Normal breath sounds. No wheezing.   Abdominal:      General: Bowel sounds are normal.      Palpations: Abdomen is soft.      Tenderness: There is no abdominal tenderness. There is no guarding.   Musculoskeletal:         General: Normal range of motion.      Cervical back: Normal range of motion and neck supple.   Neurological:      General: No focal deficit present.      Mental Status: She is alert and oriented to person, place, and time. Mental status is at baseline.   Psychiatric:         Mood and Affect: Mood normal.         Behavior: Behavior normal.         Thought Content: Thought content normal.     Inpatient Medications:  apixaban, 5 mg, oral, BID  atorvastatin, 20 mg, oral, Nightly  cefTRIAXone, 1 g, intravenous, q24h  citalopram, 20 mg, oral, q AM  gabapentin, 100 mg, oral, TID  insulin glargine, 10 Units, subcutaneous, Nightly  insulin lispro, 0-10 Units, subcutaneous, Before meals & nightly  metoprolol tartrate, 25 mg, oral, BID    PRN Medications  PRN medications: acetaminophen **OR** acetaminophen **OR** acetaminophen, dextrose, dextrose, glucagon, glucagon, meclizine, polyethylene glycol  Continuous Medications:     LABS AND IMAGING:     Labs:  Results from last 7 days   Lab Units 06/25/24  0628 06/24/24  1300   WBC AUTO x10*3/uL 10.2 11.7*   RBC AUTO  x10*6/uL 4.19 4.49   HEMOGLOBIN g/dL 13.9 15.0   HEMATOCRIT % 41.7 44.6   MCV fL 100 99   MCH pg 33.2 33.4   MCHC g/dL 33.3 33.6   RDW % 14.6* 14.5   PLATELETS AUTO x10*3/uL 235 259     Results from last 7 days   Lab Units 06/25/24  0628 06/24/24  1300   SODIUM mmol/L 141 138   POTASSIUM mmol/L 3.3* 3.9   CHLORIDE mmol/L 105 102   CO2 mmol/L 29 30   BUN mg/dL 26* 31*   CREATININE mg/dL 0.93 1.19*   GLUCOSE mg/dL 120* 187*   PROTEIN TOTAL g/dL  --  6.2*   CALCIUM mg/dL 8.4* 9.5   BILIRUBIN TOTAL mg/dL  --  0.5   ALK PHOS U/L  --  71   AST U/L  --  22   ALT U/L  --  23     Results from last 7 days   Lab Units 06/25/24  0628 06/24/24  1300   MAGNESIUM mg/dL 1.86 1.48*     Results from last 7 days   Lab Units 06/24/24  1423 06/24/24  1300   TROPHS ng/L 8 9     Imaging:  ECG 12 Lead  AV dual-paced rhythm with prolonged AV conduction  Abnormal ECG  When compared with ECG of 24-JUN-2024 12:53, (unconfirmed)  Vent. rate has increased BY   3 BPM

## 2024-06-26 ENCOUNTER — APPOINTMENT (OUTPATIENT)
Dept: CARDIOLOGY | Facility: HOSPITAL | Age: 74
End: 2024-06-26
Payer: MEDICARE

## 2024-06-26 LAB
ANION GAP SERPL CALC-SCNC: 9 MMOL/L (ref 10–20)
BASOPHILS # BLD AUTO: 0.05 X10*3/UL (ref 0–0.1)
BASOPHILS NFR BLD AUTO: 0.6 %
BUN SERPL-MCNC: 30 MG/DL (ref 6–23)
CALCIUM SERPL-MCNC: 8 MG/DL (ref 8.6–10.3)
CHLORIDE SERPL-SCNC: 106 MMOL/L (ref 98–107)
CO2 SERPL-SCNC: 28 MMOL/L (ref 21–32)
CREAT SERPL-MCNC: 1 MG/DL (ref 0.5–1.05)
EGFRCR SERPLBLD CKD-EPI 2021: 59 ML/MIN/1.73M*2
EOSINOPHIL # BLD AUTO: 0.13 X10*3/UL (ref 0–0.4)
EOSINOPHIL NFR BLD AUTO: 1.5 %
ERYTHROCYTE [DISTWIDTH] IN BLOOD BY AUTOMATED COUNT: 15.2 % (ref 11.5–14.5)
GLUCOSE BLD MANUAL STRIP-MCNC: 163 MG/DL (ref 74–99)
GLUCOSE BLD MANUAL STRIP-MCNC: 173 MG/DL (ref 74–99)
GLUCOSE BLD MANUAL STRIP-MCNC: 203 MG/DL (ref 74–99)
GLUCOSE BLD MANUAL STRIP-MCNC: 232 MG/DL (ref 74–99)
GLUCOSE BLD MANUAL STRIP-MCNC: 319 MG/DL (ref 74–99)
GLUCOSE SERPL-MCNC: 177 MG/DL (ref 74–99)
HCT VFR BLD AUTO: 43.4 % (ref 36–46)
HGB BLD-MCNC: 14.2 G/DL (ref 12–16)
HOLD SPECIMEN: NORMAL
IMM GRANULOCYTES # BLD AUTO: 0.05 X10*3/UL (ref 0–0.5)
IMM GRANULOCYTES NFR BLD AUTO: 0.6 % (ref 0–0.9)
LYMPHOCYTES # BLD AUTO: 2.43 X10*3/UL (ref 0.8–3)
LYMPHOCYTES NFR BLD AUTO: 27.1 %
MAGNESIUM SERPL-MCNC: 2.28 MG/DL (ref 1.6–2.4)
MCH RBC QN AUTO: 33.3 PG (ref 26–34)
MCHC RBC AUTO-ENTMCNC: 32.7 G/DL (ref 32–36)
MCV RBC AUTO: 102 FL (ref 80–100)
MONOCYTES # BLD AUTO: 0.6 X10*3/UL (ref 0.05–0.8)
MONOCYTES NFR BLD AUTO: 6.7 %
NEUTROPHILS # BLD AUTO: 5.7 X10*3/UL (ref 1.6–5.5)
NEUTROPHILS NFR BLD AUTO: 63.5 %
NRBC BLD-RTO: 0 /100 WBCS (ref 0–0)
PLATELET # BLD AUTO: 218 X10*3/UL (ref 150–450)
POTASSIUM SERPL-SCNC: 3.9 MMOL/L (ref 3.5–5.3)
RBC # BLD AUTO: 4.27 X10*6/UL (ref 4–5.2)
SODIUM SERPL-SCNC: 139 MMOL/L (ref 136–145)
WBC # BLD AUTO: 9 X10*3/UL (ref 4.4–11.3)

## 2024-06-26 PROCEDURE — 85025 COMPLETE CBC W/AUTO DIFF WBC: CPT | Performed by: STUDENT IN AN ORGANIZED HEALTH CARE EDUCATION/TRAINING PROGRAM

## 2024-06-26 PROCEDURE — 99232 SBSQ HOSP IP/OBS MODERATE 35: CPT | Performed by: NURSE PRACTITIONER

## 2024-06-26 PROCEDURE — 93010 ELECTROCARDIOGRAM REPORT: CPT | Performed by: INTERNAL MEDICINE

## 2024-06-26 PROCEDURE — 82947 ASSAY GLUCOSE BLOOD QUANT: CPT

## 2024-06-26 PROCEDURE — 93005 ELECTROCARDIOGRAM TRACING: CPT

## 2024-06-26 PROCEDURE — 99232 SBSQ HOSP IP/OBS MODERATE 35: CPT | Performed by: STUDENT IN AN ORGANIZED HEALTH CARE EDUCATION/TRAINING PROGRAM

## 2024-06-26 PROCEDURE — 2500000002 HC RX 250 W HCPCS SELF ADMINISTERED DRUGS (ALT 637 FOR MEDICARE OP, ALT 636 FOR OP/ED): Performed by: NURSE PRACTITIONER

## 2024-06-26 PROCEDURE — 97530 THERAPEUTIC ACTIVITIES: CPT | Mod: GP,CQ

## 2024-06-26 PROCEDURE — 36415 COLL VENOUS BLD VENIPUNCTURE: CPT | Performed by: STUDENT IN AN ORGANIZED HEALTH CARE EDUCATION/TRAINING PROGRAM

## 2024-06-26 PROCEDURE — 80048 BASIC METABOLIC PNL TOTAL CA: CPT | Performed by: STUDENT IN AN ORGANIZED HEALTH CARE EDUCATION/TRAINING PROGRAM

## 2024-06-26 PROCEDURE — 2500000001 HC RX 250 WO HCPCS SELF ADMINISTERED DRUGS (ALT 637 FOR MEDICARE OP): Performed by: STUDENT IN AN ORGANIZED HEALTH CARE EDUCATION/TRAINING PROGRAM

## 2024-06-26 PROCEDURE — 97110 THERAPEUTIC EXERCISES: CPT | Mod: GP,CQ

## 2024-06-26 PROCEDURE — 97535 SELF CARE MNGMENT TRAINING: CPT | Mod: GO

## 2024-06-26 PROCEDURE — 2500000002 HC RX 250 W HCPCS SELF ADMINISTERED DRUGS (ALT 637 FOR MEDICARE OP, ALT 636 FOR OP/ED): Performed by: STUDENT IN AN ORGANIZED HEALTH CARE EDUCATION/TRAINING PROGRAM

## 2024-06-26 PROCEDURE — 2500000004 HC RX 250 GENERAL PHARMACY W/ HCPCS (ALT 636 FOR OP/ED): Performed by: STUDENT IN AN ORGANIZED HEALTH CARE EDUCATION/TRAINING PROGRAM

## 2024-06-26 PROCEDURE — 83735 ASSAY OF MAGNESIUM: CPT | Performed by: STUDENT IN AN ORGANIZED HEALTH CARE EDUCATION/TRAINING PROGRAM

## 2024-06-26 PROCEDURE — 1200000002 HC GENERAL ROOM WITH TELEMETRY DAILY

## 2024-06-26 ASSESSMENT — COGNITIVE AND FUNCTIONAL STATUS - GENERAL
TURNING FROM BACK TO SIDE WHILE IN FLAT BAD: A LITTLE
MOVING FROM LYING ON BACK TO SITTING ON SIDE OF FLAT BED WITH BEDRAILS: A LITTLE
DRESSING REGULAR UPPER BODY CLOTHING: A LITTLE
WALKING IN HOSPITAL ROOM: A LITTLE
PERSONAL GROOMING: A LITTLE
TURNING FROM BACK TO SIDE WHILE IN FLAT BAD: A LITTLE
DAILY ACTIVITIY SCORE: 20
MOVING TO AND FROM BED TO CHAIR: A LITTLE
DRESSING REGULAR LOWER BODY CLOTHING: A LITTLE
DAILY ACTIVITIY SCORE: 19
TURNING FROM BACK TO SIDE WHILE IN FLAT BAD: A LITTLE
MOBILITY SCORE: 18
MOVING TO AND FROM BED TO CHAIR: A LITTLE
CLIMB 3 TO 5 STEPS WITH RAILING: A LITTLE
CLIMB 3 TO 5 STEPS WITH RAILING: A LITTLE
STANDING UP FROM CHAIR USING ARMS: A LITTLE
TOILETING: A LITTLE
WALKING IN HOSPITAL ROOM: A LITTLE
STANDING UP FROM CHAIR USING ARMS: A LITTLE
MOBILITY SCORE: 18
DRESSING REGULAR UPPER BODY CLOTHING: A LITTLE
WALKING IN HOSPITAL ROOM: A LITTLE
TOILETING: A LITTLE
MOVING TO AND FROM BED TO CHAIR: A LITTLE
TOILETING: A LITTLE
PERSONAL GROOMING: A LITTLE
MOVING FROM LYING ON BACK TO SITTING ON SIDE OF FLAT BED WITH BEDRAILS: A LITTLE
HELP NEEDED FOR BATHING: A LITTLE
STANDING UP FROM CHAIR USING ARMS: A LITTLE
HELP NEEDED FOR BATHING: A LITTLE
CLIMB 3 TO 5 STEPS WITH RAILING: A LOT
DRESSING REGULAR LOWER BODY CLOTHING: A LITTLE
DAILY ACTIVITIY SCORE: 21
MOBILITY SCORE: 18
HELP NEEDED FOR BATHING: A LITTLE

## 2024-06-26 ASSESSMENT — PAIN - FUNCTIONAL ASSESSMENT
PAIN_FUNCTIONAL_ASSESSMENT: 0-10

## 2024-06-26 ASSESSMENT — ACTIVITIES OF DAILY LIVING (ADL): HOME_MANAGEMENT_TIME_ENTRY: 23

## 2024-06-26 ASSESSMENT — PAIN SCALES - GENERAL
PAINLEVEL_OUTOF10: 0 - NO PAIN
PAINLEVEL_OUTOF10: 0 - NO PAIN

## 2024-06-26 NOTE — PROGRESS NOTES
Cardiology Progress Note  Patient: Twyla Waller  Unit/Bed: 918/918-A  YOB: 1950  MRN: [id number]  Acct: [id number]   Admitting Diagnosis:   Cystitis [N30.90]  Lightheaded [R42]  Pacemaker [Z95.0]  Near syncope [R55]  Fatigue, unspecified type [R53.83]  Date:  6/24/2024  Hospital Day: 1  Attending: Sylvester Buckley MD   Rounding CATRACHITA/Cardiologist:  PATRICIA Fabian-CNP,        Primary Cardiologist: Dr. Ofelia Camargo      Complaint:  Chief Complaint   Patient presents with    Dizziness     Had issue with pacemaker in cardio office Friday. Had appointment today and BP was found low and HR variable 40-80        SUBJECTIVE    Patient up out of bed ambulating in young.  Patient still with complaints of lightheadedness and dizziness.  Denies palpitations or shortness of breath.  Remains in atrial tachycardia with heart rate 70 to 120 bpm        VITALS   Visit Vitals  /88 (BP Location: Right arm, Patient Position: Sitting)   Pulse 93   Temp 35.7 °C (96.3 °F) (Temporal)   Resp 17        I/O:    Intake/Output Summary (Last 24 hours) at 6/26/2024 1447  Last data filed at 6/26/2024 0819  Gross per 24 hour   Intake 100 ml   Output --   Net 100 ml        Allergies:  Allergies   Allergen Reactions    Morphine Other     Mental Status Change    Verapamil Unknown    Calcium Channel Blocking Agents-Dihydropyridines Other     irregular heart rate    Latex Itching and Rash    Penicillins Rash    Ace Inhibitors Cough    Alendronate Unknown    Calcium Channel Blocking Agent Diltiazem Analogues Itching    Ibuprofen Unknown    Lisinopril Cough    Metformin Diarrhea    Rybelsus [Semaglutide] Diarrhea    Sertraline Unknown    Iodine Rash        PHYSICAL EXAM   Physical Exam      LABS     Results for orders placed or performed during the hospital encounter of 06/24/24 (from the past 24 hour(s))   POCT GLUCOSE   Result Value Ref Range    POCT Glucose 174 (H) 74 - 99 mg/dL   POCT GLUCOSE   Result Value Ref Range    POCT  Glucose 219 (H) 74 - 99 mg/dL   CBC and Auto Differential   Result Value Ref Range    WBC 9.0 4.4 - 11.3 x10*3/uL    nRBC 0.0 0.0 - 0.0 /100 WBCs    RBC 4.27 4.00 - 5.20 x10*6/uL    Hemoglobin 14.2 12.0 - 16.0 g/dL    Hematocrit 43.4 36.0 - 46.0 %     (H) 80 - 100 fL    MCH 33.3 26.0 - 34.0 pg    MCHC 32.7 32.0 - 36.0 g/dL    RDW 15.2 (H) 11.5 - 14.5 %    Platelets 218 150 - 450 x10*3/uL    Neutrophils % 63.5 40.0 - 80.0 %    Immature Granulocytes %, Automated 0.6 0.0 - 0.9 %    Lymphocytes % 27.1 13.0 - 44.0 %    Monocytes % 6.7 2.0 - 10.0 %    Eosinophils % 1.5 0.0 - 6.0 %    Basophils % 0.6 0.0 - 2.0 %    Neutrophils Absolute 5.70 (H) 1.60 - 5.50 x10*3/uL    Immature Granulocytes Absolute, Automated 0.05 0.00 - 0.50 x10*3/uL    Lymphocytes Absolute 2.43 0.80 - 3.00 x10*3/uL    Monocytes Absolute 0.60 0.05 - 0.80 x10*3/uL    Eosinophils Absolute 0.13 0.00 - 0.40 x10*3/uL    Basophils Absolute 0.05 0.00 - 0.10 x10*3/uL   Basic Metabolic Panel   Result Value Ref Range    Glucose 177 (H) 74 - 99 mg/dL    Sodium 139 136 - 145 mmol/L    Potassium 3.9 3.5 - 5.3 mmol/L    Chloride 106 98 - 107 mmol/L    Bicarbonate 28 21 - 32 mmol/L    Anion Gap 9 (L) 10 - 20 mmol/L    Urea Nitrogen 30 (H) 6 - 23 mg/dL    Creatinine 1.00 0.50 - 1.05 mg/dL    eGFR 59 (L) >60 mL/min/1.73m*2    Calcium 8.0 (L) 8.6 - 10.3 mg/dL   SST TOP   Result Value Ref Range    Extra Tube Hold for add-ons.    Magnesium   Result Value Ref Range    Magnesium 2.28 1.60 - 2.40 mg/dL   POCT GLUCOSE   Result Value Ref Range    POCT Glucose 173 (H) 74 - 99 mg/dL   ECG 12 Lead   Result Value Ref Range    Ventricular Rate 85 BPM    Atrial Rate 85 BPM    [place] Interval 242 ms    QRS Duration 154 ms    QT Interval 428 ms    QTC Calculation(Bazett) 509 ms    P Axis 50 degrees    R Axis -74 degrees    T Axis 126 degrees    QRS Count 14 beats    Q Onset 193 ms    P Onset 73 ms    P Offset 131 ms    T Offset 407 ms    QTC Fredericia 480 ms   POCT GLUCOSE   Result  Value Ref Range    POCT Glucose 319 (H) 74 - 99 mg/dL        Scheduled medications  amiodarone, 200 mg, oral, Daily  apixaban, 5 mg, oral, BID  atorvastatin, 20 mg, oral, Nightly  gabapentin, 100 mg, oral, TID  insulin glargine, 10 Units, subcutaneous, Nightly  insulin lispro, 0-10 Units, subcutaneous, Before meals & nightly  metoprolol tartrate, 25 mg, oral, BID      Continuous medications  [START ON 6/27/2024] sodium chloride 0.9%, 10 mL/hr      PRN medications  PRN medications: acetaminophen **OR** acetaminophen **OR** acetaminophen, dextrose, dextrose, glucagon, glucagon, meclizine, polyethylene glycol     ECG 12 Lead    Result Date: 6/26/2024  AV dual-paced rhythm with prolonged AV conduction Abnormal ECG When compared with ECG of 25-JUN-2024 10:18, (unconfirmed) No significant change was found    ECG 12 lead    Result Date: 6/25/2024  AV dual-paced rhythm with prolonged AV conduction Abnormal ECG When compared with ECG of 06-JUN-2024 13:50, Vent. rate has decreased BY   6 BPM See ED provider note for full interpretation and clinical correlation Confirmed by Jessi Lloyd ([id number]) on 6/25/2024 9:06:20 PM    ECG 12 Lead    Result Date: 6/25/2024  AV dual-paced rhythm with prolonged AV conduction Abnormal ECG When compared with ECG of 24-JUN-2024 12:53, (unconfirmed) Vent. rate has increased BY   3 BPM    CT head wo IV contrast    Result Date: 6/24/2024  Interpreted By:  Ai Mendoza, STUDY: CT HEAD WO IV CONTRAST;  6/24/2024 3:09 pm   INDICATION: Signs/Symptoms:weakness.   COMPARISON: None.   ACCESSION NUMBER(S): [id number]   ORDERING CLINICIAN: PRINCE FOURNIER   TECHNIQUE: Noncontrast axial CT scan of head was performed. Multiplanar reconstructions   FINDINGS: No acute intracranial hemorrhage, mass effect, midline shift, or herniation. No evidence of hydrocephalus. Periventricular and subcortical deep white matter hypodensity suggestive of chronic microangiopathic change. The ventricles and sulci are  unremarkable for age. The visualized paranasal sinuses and mastoid air cells are clear. No acute osseous abnormality of the calvarium. No destructive bone lesion.       No acute intracranial abnormality. Consider follow-up with MRI as warranted.     Signed by: Ai Mendoza 6/24/2024 3:45 PM Dictation workstation:   [id number]    ECG 12 lead (Clinic Performed)    Result Date: 6/21/2024  See scan    Transesophageal Echo (SHIELA)    Result Date: 6/19/2024          [place] [id number]  Tel [phone] Fax [phone] TRANSESOPHAGEAL ECHOCARDIOGRAM REPORT  Patient Name:      SHAYKEDAR SOMERS       Reading Physician:    [id number] Yaron Lara MD Study Date:        6/6/2024             Ordering Provider:    [id number] FORREST BRAXTON MRN/PID:           [id number]             Fellow: Accession#:        [id number]         Nurse:                Anita Glaser Date of Birth/Age: 1950 / 74 years Sonographer:          Coty Whittington [facility] Gender:            F                    Additional Staff: Height:            160.02 cm            Admit Date: Weight:            73.94 kg             Admission Status:     Outpatient BSA / BMI:         1.77 m2 / 28.87      Department Location:  [address] [place] [address]                    kg/m2 Blood Pressure: 167 /106 mmHg Study Type:    TRANSESOPHAGEAL ECHO (SHIELA) Diagnosis/ICD: Unspecified atrial fibrillation-I48.91 Indication:    A.Fib CPT Codes:     SHIELA Complete-08301; Moderate Sedation Services initial 15 minutes                patient >5 years-[id number]  Study Detail: The following Echo studies were performed: 2D, Doppler and color               flow. Agitated saline used as a contrast agent for intraseptal               flow evaluation.  PHYSICIAN INTERPRETATION: SHIELA Details: The SHIELA probe used was [id number]. Technically adequate omniplane  transesophageal echocardiogram performed. Agitated saline contrast and color flow Doppler echo was performed to assess for the presence of a patent foramen ovale. SHIELA Medication: The pharynx was anesthetized with Cetacaine spray and viscous lidocaine. The patient was sedated using moderate sedation. Total intraservice time for moderate sedation was 24 minutes. Midazolam and Fentanyl was used to sedate the patient for this exam. SHIELA Procedure: The probe was passed without difficulty. The following complication was encountered during the procedure: Patient tolerated the procedure well without any apparent complications. Left Ventricle: Left ventricular systolic function is normal, with an estimated ejection fraction of 55-60%. There are no regional wall motion abnormalities. The left ventricular cavity size is normal. Left ventricular diastolic filling was indeterminate. Left Atrium: The left atrium is moderately dilated. There is no definite left atrial thrombus present. A bubble study using agitated saline was performed. Bubble study is negative. The left atrial appendage is small, there is a normal sized left atrial appendage and there is no thrombus visualized in the left atrial appendage. Right Ventricle: The right ventricle is normal in size. There is normal right ventricular global systolic function. Right Atrium: The right atrium is mildly dilated. Aortic Valve: The aortic valve is trileaflet. There is mild aortic valve regurgitation. Mitral Valve: The mitral valve is mild to moderately thickened. There is moderate mitral valve regurgitation. Tricuspid Valve: The tricuspid valve is structurally normal. There is moderate tricuspid regurgitation. Pulmonic Valve: The pulmonic valve is not well visualized. There is no indication of pulmonic valve regurgitation. Pericardium: There is no pericardial effusion noted. Aorta: The aortic root is normal. There is plaque visualized in the descending aorta which is  classified as a Grade 2 [mild (focal or diffuse) intimal thickening of 2-3 mm] atherosclerosis. Pulmonary Veins: The pulmonary veins appear normal and return normally to the left atrium.  CONCLUSIONS:  1. Left ventricular systolic function is normal with a 55-60% estimated ejection fraction.  2. The left atrium is moderately dilated.  3. Moderate mitral valve regurgitation.  4. Moderate tricuspid regurgitation.  5. Mild aortic valve regurgitation.  6. No left atrial thrombus.  7. There is plaque visualized in the descending aorta. QUANTITATIVE DATA SUMMARY: TRICUSPID VALVE/RVSP:                             Normal Ranges: Peak TR Velocity: 2.93 m/s RV Syst Pressure: 37.3 mmHg (< 30mmHg)  [id number] Yaron Lara MD Electronically signed on 6/8/2024 at 12:02:38 PM  ** Final **     ECG 12 lead    Result Date: 6/9/2024  Atrial-sensed ventricular-paced rhythm with prolonged AV conduction Abnormal ECG When compared with ECG of 06-JUN-2024 08:00, Vent. rate has increased BY  13 BPM Confirmed by Basim Hunter ([id number]) on 6/9/2024 8:11:11 PM    Cardioversion external    Result Date: 6/6/2024  .Cardioversion Summary: External electric cardioversion of atrial fibrillation/ atrial flutter to AV paced rhythm was achieved. Recommendations: A 12 lead ECG should be performed prior to discharge from the hospital. The patient should continue with the present medications. DDD pacing at 90 ppm x 1 hour, then the device will be reprogrammed to her baseline settings before discharge. Discharge: The patient recovered uneventfully from the effects of conscious and deep sedation. The patient left the EP laboratory hemodynamically stable and without neurological deficits. Follow up: The patient will be discharged on the day of the procedure, following bed rest and subsequent ambulation, provided the recovery parameters are appropriate. The patient should call the electrophysiologist immediately if symptoms recur, or for any problems. The patient  has  been instructed accordingly.  I attempted to call the patient's  with the provided number as well as the number in the electronic record, and neither number rings through. Message left with clinical staff to contact patient's . See signed procedural log and parameters. Procedures: Cardioversion.  Pre and post pacemaker analysis and reprogramming. Diagnosis: Atrial fibrillation. Atrial flutter. Patient history: Please refer to the detailed history and physical on the patient's medical chart. Procedure narrative: The risks, benefits, and alternatives to the procedure and sedation were explained to the patient, and informed consent was obtained. The patient was in the fasting state. A grounding pad was placed. Self-adhesive anterior-posterior defibrillation pads were applied. A LangoLabL defibrillator was used for monitoring and the defibrillator waveform was set to biphasic. The patient was set up for continuous monitoring of surface 12 lead ECG, capnography, and pulse oximetry. Blood pressure was monitored with automatic cuff measurements. The procedure was performed under IV conscious sedation supplemented with intermittent deep sedation. The device was analyzed and reprogrammed. External electric cardioversion of atrial fibrillation / atrial flutter to AV paced rhythm was achieved with 200 Joules via AP patches. The device was reprogrammed. Complications: The patient tolerated the procedure without any complications or incident.     ECG 12 lead    Result Date: 6/6/2024  Ventricular-paced rhythm Abnormal ECG When compared with ECG of 08-FEB-2023 12:06, Electronic ventricular pacemaker has replaced Sinus rhythm Confirmed by Basim Hunter ([id number]) on 6/6/2024 8:46:33 AM       Encounter Date: 06/24/24   ECG 12 Lead   Result Value    Ventricular Rate 85    Atrial Rate 85    [place] Interval 242    QRS Duration 154    QT Interval 428    QTC Calculation(Bazett) 509    P Axis 50    R Axis -74    T Axis 126    QRS  Count 14    Q Onset 193    P Onset 73    P Offset 131    T Offset 407    QTC Fredericia 480    Narrative    AV dual-paced rhythm with prolonged AV conduction  Abnormal ECG  When compared with ECG of 25-JUN-2024 10:18, (unconfirmed)  No significant change was found        Tele Monitoring: Atrial tachycardia with heart rate 70 -120 bpm/EKG shows atrial tachycardia with heart rate 85 bpm and QTc 509 ms    Assessment     Patient Active Problem List   Diagnosis    Abnormal laboratory test result    Age-related osteoporosis without current pathological fracture    Benign essential hypertension    BPPV (benign paroxysmal positional vertigo)    Cervical disc disease    Primary osteoarthritis of cervical spine    Change in bowel habits    Chest pain    Dependence on other enabling machines and devices    Diastolic heart failure (Multi)    Dizziness    Dysphagia    Fibromyalgia muscle pain    Fracture of humerus    Hyperlipidemia    Hypertensive heart and chronic kidney disease with heart failure and stage 1 through stage 4 chronic kidney disease, or unspecified chronic kidney disease (Multi)    Hypoproteinemia (Multi)    Hypoxia    Iron deficiency anemia secondary to inadequate dietary iron intake    Irritable bowel syndrome with constipation    Localized osteoarthritis of knees, bilateral    Vitamin D deficiency    Hypertensive disorder    Venous insufficiency of both lower extremities    Type 2 diabetes mellitus (Multi)    Stage 3a chronic kidney disease (Multi)    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)    Renal calculi    PTSD (post-traumatic stress disorder)    Paranoid state, simple (Multi)    Pacemaker    Other obesity due to excess calories    Osteoporosis    Osteoarthritis of knee    Obstructive sleep apnea (adult) (pediatric)    Mixed stress and urge urinary incontinence    Migraine without aura    Lung nodule    Localized primary osteoarthritis of right lower leg    OAB (overactive bladder)    Low compliance  bladder    AV block    Chronotropic incompetence    Depression    Dyspnea    High-grade atrioventricular block    Near syncope    Orthostatic hypotension    Overweight with body mass index (BMI) of 29 to 29.9 in adult    Sinus node dysfunction (Multi)    Encounter for medication review and counseling    Encounter to discuss test results    Never smoked any substance    Lightheaded    Fatigue, unspecified type    Atrial tachycardia (CMS-HCC)      Clinical impression:  1.  Recurrent atrial tachycardia status post recent cardioversion 6/6/2024  2.  Sinus node dysfunction with remote dual-chamber permanent pacemaker implant  3.  Hypertension  4.  Diabetes mellitus  5.  Abnormal urinalysis currently on IV antibiotics    Plan:  Continue amiodarone 200 mg daily  Continue metoprolol tartrate 25 mg twice daily  Continue anticoagulation with Eliquis  Daily labs and EKG  Continue to monitor on telemetry  Plan for cardioversion in a.m. with Dr Ofelia Camargo            Electronically signed by FRANTZ Fabian on 6/26/2024 at 2:47 PM

## 2024-06-26 NOTE — PROGRESS NOTES
Occupational Therapy    Occupational Therapy Treatment    Name: Twyla Waller  MRN: [id number]  : 1950  Date: 24  Time Calculation  Start Time: 1345  Stop Time: 1408  Time Calculation (min): 23 min    Assessment:  End of Session Communication: Bedside nurse  End of Session Patient Position: Bed, 3 rail up, Alarm on (all needs in reach)  Plan:  Treatment Interventions: ADL retraining, Endurance training, Functional transfer training  OT Frequency: 2 times per week  OT Discharge Recommendations: Low intensity level of continued care  OT - OK to Discharge: Yes (when medically stable/cleared)    Subjective   Previous Visit Info:  OT Last Visit  OT Received On: 24  General:  General  Prior to Session Communication: Bedside nurse  Patient Position Received: Bed, 3 rail up, Alarm on  General Comment: Pt. agreeable to therapy.  Precautions:  Medical Precautions: Fall precautions  Vitals:  Vital Signs  Heart Rate:  (80)  Pain Assessment:  Pain Assessment  Pain Assessment: 0-10  0-10 (Numeric) Pain Score: 0 - No pain     Objective   Cognition:  Overall Cognitive Status: Within Functional Limits  Orientation Level: Oriented X4  Activities of Daily Living:      Grooming  Grooming Level of Assistance: Contact guard  Grooming Where Assessed: Standing sinkside  Grooming Comments: hand hygiene, oral care, hair brushing, tasks included 1 UE support to no UE supports during dynamic standing balance.  Fair (+) dynamic standing balance.    Toileting  Toileting Level of Assistance: Contact guard  Where Assessed: Toilet  Toileting Comments: FWW and safety bars.  Proper use of bars for sit/stand.  Cues for walker safety.  Pt. reports having bars by toilet at home.       Bed Mobility/Transfers: Bed Mobility  Bed Mobility: Yes  Bed Mobility 1  Bed Mobility 1: Sitting to supine, Supine to sitting  Level of Assistance 1:  (SBA)  Bed Mobility Comments 1: Pt. very close to EOB, and seemingly unaware.  Cues for attention to  position in bed and SBA during transition to sitting, for safety, 2nd to proximity to EOB.  Sit to sup with verbal cues to scoot away from EOB once supine.    Transfers  Transfer: Yes  Transfer 1  Technique 1: Sit to stand, Stand to sit  Transfer Device 1: Walker, Gait belt  Transfer Level of Assistance 1: Contact guard  Trials/Comments 1: max cues for walker safety, CGA for balance.  Sit/stand at EOB and toilet.    Functional Mobility:  Functional Mobility  Functional Mobility Performed: Yes  Functional Mobility 1  Device 1: Rolling walker  Functional Mobility Support Devices: Gait belt  Assistance 1: Contact guard  Comments 1: Ambulated between tasks in room with FWW and max cues for walker safety, especially when approaching toilet, sink, EOB, when pt. attempts to abandon and step around walker.  Limited carryover, requiring repeat cues for walker safety in each situation.  Sitting Balance:  Static Sitting Balance  Static Sitting-Comment/Number of Minutes: Normal  Dynamic Sitting Balance  Dynamic Sitting-Comments: Good  Standing Balance:  Static Standing Balance  Static Standing-Comment/Number of Minutes: Good (-)  Dynamic Standing Balance  Dynamic Standing-Comments: Fair (+)    Outcome Measures:  [facility] Daily Activity  Putting on and taking off regular lower body clothing: A little  Bathing (including washing, rinsing, drying): A little  Putting on and taking off regular upper body clothing: A little  Toileting, which includes using toilet, bedpan or urinal: A little  Taking care of personal grooming such as brushing teeth: A little  Eating Meals: None  Daily Activity - Total Score: 19        Education Documentation  Body Mechanics, taught by Kika Hopkins OT at 6/26/2024  3:11 PM.  Learner: Patient  Readiness: Acceptance  Method: Explanation  Response: Verbalizes Understanding, Needs Reinforcement    Precautions, taught by Kika Hopkins OT at 6/26/2024  3:11 PM.  Learner: Patient  Readiness:  Acceptance  Method: Explanation  Response: Verbalizes Understanding, Needs Reinforcement    ADL Training, taught by Kika Hopkins OT at 6/26/2024  3:11 PM.  Learner: Patient  Readiness: Acceptance  Method: Explanation  Response: Verbalizes Understanding, Needs Reinforcement    Education Comments  No comments found.      Goals:  Encounter Problems       Encounter Problems (Active)       OT Goals       Mod I for all functional transfers  (Progressing)       Start:  06/25/24    Expected End:  07/09/24            Good dyn standing balance during ADLs an functional transfers  (Progressing)       Start:  06/25/24    Expected End:  07/09/24            Mod I LB dressing  (Progressing)       Start:  06/25/24    Expected End:  07/09/24            Mod I toileting tasks and clothing mgmt  (Progressing)       Start:  06/25/24    Expected End:  07/09/24

## 2024-06-26 NOTE — PROGRESS NOTES
Physical Therapy    Physical Therapy Treatment    Patient Name: Twyla Waller  MRN: [id number]  Today's Date: 6/26/2024  Time Calculation  Start Time: 1047  Stop Time: 1127  Time Calculation (min): 40 min     [id number]/918-A    Assessment/Plan   PT Assessment  PT Assessment Results: Decreased strength, Decreased endurance, Impaired balance, Decreased mobility, Decreased coordination  Rehab Prognosis: Good  Treatment Tolerance: Patient tolerated treatment well, Patient limited by fatigue  Medical Staff Made Aware: Yes  End of Session Communication: Bedside nurse, PCT/NA/CTA  Assessment Comment: Patient showing progress with transfers/amb. Patient will benefit from additional PT to address deficits and improve mobility.  End of Session Patient Position: Up in chair, Alarm off, not on at start of session (Call light, phone, and tray table within reach.)  PT Plan  Inpatient/Swing Bed or Outpatient: Inpatient  Treatment/Interventions: Bed mobility, Transfer training, Gait training, Stair training, Balance training, Strengthening, Endurance training, Therapeutic exercise, Therapeutic activity  PT Plan: Ongoing PT  PT Frequency: 3 times per week  PT Discharge Recommendations: Low intensity level of continued care    PT Recommended Transfer Status: Assist x1, Assistive device    General Visit Information:   PT  Visit  PT Received On: 06/26/24  General  Family/Caregiver Present: No  Prior to Session Communication: Bedside nurse, PCT/NA/CTA  Patient Position Received: Bed, 3 rail up, Alarm on  General Comment: Pleasant and cooperative.    General Observations:   General Observation: Tele.    Subjective     Precautions:  Precautions  Medical Precautions: Fall precautions    Vital Signs:  Vital Signs  Heart Rate:  (91-111bpm during treatment.)  SpO2:  (On RA. SPO2 95-98% during session. No c/o SOB.)    Objective     Pain:  Pain Assessment  Pain Assessment: 0-10 (c/o some soreness in her back, but  tolerable.)    Cognition:  Cognition  Overall Cognitive Status: Within Functional Limits    Balance:      Dynamic Sitting Balance  Dynamic Sitting-Comments: fair +  Static Standing Balance  Static Standing-Comment/Number of Minutes: F+ with ww  Dynamic Standing Balance  Dynamic Standing-Comments: F with ww      Treatments:  Therapeutic Exercise  Therapeutic Exercise Performed: Yes  Therapeutic Exercise Activity 2: Instructed patient in seated ther ex. AROM BLE with AP/LAQ/Hipflex/Abd-add(with resistance) x12. No c/o pain. Encouraged patient to try exercises more on her own to increase activity; pt agreeable.           Ambulation/Gait Training  Ambulation/Gait Training Performed: Yes  Ambulation/Gait Training 1  Surface 1: Level tile  Device 1: No device, Rolling walker  Assistance 1: Contact guard  Comments/Distance (ft) 1: ~6ft x1 without AD and ~200ft with ww. NBOS. Slow nathalia. Step through gait pattern. Decreased step height/length B. v/c and (A) for safer maneuvering of ww with 90/180° turns. Patient reaching out for support from surroundings when not using ww; pt admits that she has better balance with use of ww. Patient agreeable to keep using ww for transfers/amb.  Transfers  Transfer: Yes  Transfer 1  Technique 1: Sit to stand, Stand to sit  Transfer Device 1:  (ww)  Transfer Level of Assistance 1:  (SBA)  Trials/Comments 1: (2x). v/c for safe hand placement and technique. Slow transition of hands to/from ww.          Outcome Measures:  [facility] Basic Mobility  Turning from your back to your side while in a flat bed without using bedrails: A little  Moving from lying on your back to sitting on the side of a flat bed without using bedrails: A little  Moving to and from bed to chair (including a wheelchair): A little  Standing up from a chair using your arms (e.g. wheelchair or bedside chair): A little  To walk in hospital room: A little  Climbing 3-5 steps with railing: A little  Basic Mobility - Total Score:  18    Education Documentation  Mobility Training, taught by Kimberly Nuno PTA at 6/26/2024  4:19 PM.  Learner: Patient  Readiness: Acceptance  Method: Explanation, Demonstration  Response: Verbalizes Understanding, Needs Reinforcement  Comment: See therapy note.    EDUCATION:  Individual(s) Educated: Patient  Education Provided: Body Mechanics, Fall Risk, Home Safety, POC, Posture (Balance; Safety with transfers/amb.)  Patient Response to Education: Patient/Caregiver Verbalized Understanding of Information, Patient/Caregiver Performed Return Demonstration of Exercises/Activities, Patient/Caregiver Asked Appropriate Questions    Encounter Problems       Encounter Problems (Active)       PT Problem       Pt will demonstrate mod I  with bed mobility to edge of bed.   (Progressing)       Start:  06/25/24    Expected End:  07/09/24            Pt will demonstrate mod I  with sit to stand/chair transfers with FWW.   (Progressing)       Start:  06/25/24    Expected End:  07/09/24            Pt will ambulate 100 feet with FWW mod I .   (Progressing)       Start:  06/25/24    Expected End:  07/09/24            Pt to demo improved BLE strength by being able to complete supine/seated thera ex 2x20 BLEs with 4 or less rest breaks .   (Progressing)       Start:  06/25/24    Expected End:  07/09/24

## 2024-06-26 NOTE — PROGRESS NOTES
Medical Group Progress Note  ASSESSMENT & PLAN:     Persistent atrial tachycardia  Persistent atrial fibrillation status post recent SHIELA and DCCV  Sick sinus syndrome with high degree AV block status post dual-chamber pacemaker (2023)  Long-term anticoagulation use  - See H&P for more recent information regarding admission   - Pacemaker interrogation reviewed  - EP following  - Started amiodarone 6/25 plan for cardioversion for 6/27  - Continuing apixaban     Acute cystitis  - Completed ceftriaxone x3 doses (6/24-26)  - Urine cultures never resulted     Hypomagnesemia - resolved  Hypophosphatemia - resolved  Hypokalemia - resolved  - Replace as indicated     Type II DM  Hyperlipidemia  GERD  Essential hypertension  - Continuing home medications as reconciled     VTE prophylaxis: Home apixaban    Disposition/Daily update: Completing ceftriaxone for the third day today, urine cultures were sent from the ED therefore we do not have any further information.  Started amiodarone, plan for cardioversion tomorrow.  Potential discharge home afterwards, however will need clearance from EP.      ---Of note, this documentation is completed using the Dragon Dictation system (voice recognition software). There may be spelling and/or grammatical errors that were not corrected prior to final submission.---    Sylvester Buckley MD    SUBJECTIVE     Patient was seen and examined bedside this morning.  She had no acute events overnight.  Is feeling much better today no episodes of lightheadedness.  Aware of plan for possible cardioversion tomorrow.  Had no complaints to me after starting amiodarone yesterday.    OBJECTIVE:     Last Recorded Vitals:  Vitals:    06/25/24 1926 06/25/24 2316 06/26/24 0443 06/26/24 0819   BP: 115/72 142/83 122/71 140/76   BP Location:   Left leg Right arm   Patient Position: Sitting Sitting Lying Lying   Pulse: 79 103 93 77   Resp: 16 12 16 16   Temp: 36.4 °C (97.5 °F) 36 °C (96.8 °F) 36.7 °C (98.1 °F)  36.1 °C (97 °F)   TempSrc:   Temporal Temporal   SpO2: 96% 95% 96% 96%   Weight:       Height:         Last I/O:  I/O last 3 completed shifts:  In: 400 (5.4 mL/kg) [P.O.:250; I.V.:100 (1.4 mL/kg); IV Piggyback:50]  Out: 1325 (18 mL/kg) [Urine:1325 (0.5 mL/kg/hr)]  Weight: 73.6 kg     Physical Exam  Constitutional:       General: She is not in acute distress.     Appearance: Normal appearance. She is not toxic-appearing.   HENT:      Head: Normocephalic and atraumatic.   Eyes:      Extraocular Movements: Extraocular movements intact.      Conjunctiva/sclera: Conjunctivae normal.   Cardiovascular:      Rate and Rhythm: Normal rate. Rhythm irregular.      Pulses: Normal pulses.      Heart sounds: Normal heart sounds.   Pulmonary:      Effort: Pulmonary effort is normal. No respiratory distress.      Breath sounds: Normal breath sounds. No wheezing.   Abdominal:      General: Bowel sounds are normal.      Palpations: Abdomen is soft.      Tenderness: There is no abdominal tenderness. There is no guarding.   Musculoskeletal:         General: Normal range of motion.      Cervical back: Normal range of motion and neck supple.   Neurological:      General: No focal deficit present.      Mental Status: She is alert and oriented to person, place, and time. Mental status is at baseline.   Psychiatric:         Mood and Affect: Mood normal.         Behavior: Behavior normal.         Thought Content: Thought content normal.     Inpatient Medications:  amiodarone, 200 mg, oral, Daily  apixaban, 5 mg, oral, BID  atorvastatin, 20 mg, oral, Nightly  cefTRIAXone, 1 g, intravenous, q24h  gabapentin, 100 mg, oral, TID  insulin glargine, 10 Units, subcutaneous, Nightly  insulin lispro, 0-10 Units, subcutaneous, Before meals & nightly  metoprolol tartrate, 25 mg, oral, BID    PRN Medications  PRN medications: acetaminophen **OR** acetaminophen **OR** acetaminophen, dextrose, dextrose, glucagon, glucagon, meclizine, polyethylene  glycol  Continuous Medications:  [START ON 6/27/2024] sodium chloride 0.9%, 10 mL/hr      LABS AND IMAGING:     Labs:  Results from last 7 days   Lab Units 06/26/24  0538 06/25/24  0628 06/24/24  1300   WBC AUTO x10*3/uL 9.0 10.2 11.7*   RBC AUTO x10*6/uL 4.27 4.19 4.49   HEMOGLOBIN g/dL 14.2 13.9 15.0   HEMATOCRIT % 43.4 41.7 44.6   MCV fL 102* 100 99   MCH pg 33.3 33.2 33.4   MCHC g/dL 32.7 33.3 33.6   RDW % 15.2* 14.6* 14.5   PLATELETS AUTO x10*3/uL 218 235 259     Results from last 7 days   Lab Units 06/26/24  0538 06/25/24  0628 06/24/24  1300   SODIUM mmol/L 139 141 138   POTASSIUM mmol/L 3.9 3.3* 3.9   CHLORIDE mmol/L 106 105 102   CO2 mmol/L 28 29 30   BUN mg/dL 30* 26* 31*   CREATININE mg/dL 1.00 0.93 1.19*   GLUCOSE mg/dL 177* 120* 187*   PROTEIN TOTAL g/dL  --   --  6.2*   CALCIUM mg/dL 8.0* 8.4* 9.5   BILIRUBIN TOTAL mg/dL  --   --  0.5   ALK PHOS U/L  --   --  71   AST U/L  --   --  22   ALT U/L  --   --  23     Results from last 7 days   Lab Units 06/26/24  0538 06/25/24  0628 06/24/24  1300   MAGNESIUM mg/dL 2.28 1.86 1.48*     Results from last 7 days   Lab Units 06/24/24  1423 06/24/24  1300   TROPHS ng/L 8 9     Imaging:  ECG 12 Lead  AV dual-paced rhythm with prolonged AV conduction  Abnormal ECG  When compared with ECG of 25-JUN-2024 10:18, (unconfirmed)  No significant change was found

## 2024-06-26 NOTE — CARE PLAN
The patient's goals for the shift include      The clinical goals for the shift include remain free from injury by end of shift.

## 2024-06-27 ENCOUNTER — APPOINTMENT (OUTPATIENT)
Dept: CARDIOLOGY | Facility: HOSPITAL | Age: 74
End: 2024-06-27
Payer: MEDICARE

## 2024-06-27 VITALS
HEART RATE: 73 BPM | HEIGHT: 63 IN | RESPIRATION RATE: 18 BRPM | SYSTOLIC BLOOD PRESSURE: 136 MMHG | BODY MASS INDEX: 28.75 KG/M2 | DIASTOLIC BLOOD PRESSURE: 84 MMHG | OXYGEN SATURATION: 94 % | TEMPERATURE: 97 F | WEIGHT: 162.26 LBS

## 2024-06-27 LAB
ANION GAP SERPL CALC-SCNC: 8 MMOL/L (ref 10–20)
ATRIAL RATE: 70 BPM
ATRIAL RATE: 84 BPM
ATRIAL RATE: 85 BPM
BASOPHILS # BLD AUTO: 0.05 X10*3/UL (ref 0–0.1)
BASOPHILS NFR BLD AUTO: 0.6 %
BUN SERPL-MCNC: 30 MG/DL (ref 6–23)
CALCIUM SERPL-MCNC: 8 MG/DL (ref 8.6–10.3)
CHLORIDE SERPL-SCNC: 106 MMOL/L (ref 98–107)
CO2 SERPL-SCNC: 30 MMOL/L (ref 21–32)
CREAT SERPL-MCNC: 1.14 MG/DL (ref 0.5–1.05)
EGFRCR SERPLBLD CKD-EPI 2021: 50 ML/MIN/1.73M*2
EOSINOPHIL # BLD AUTO: 0.13 X10*3/UL (ref 0–0.4)
EOSINOPHIL NFR BLD AUTO: 1.5 %
ERYTHROCYTE [DISTWIDTH] IN BLOOD BY AUTOMATED COUNT: 15.1 % (ref 11.5–14.5)
GLUCOSE BLD MANUAL STRIP-MCNC: 200 MG/DL (ref 74–99)
GLUCOSE BLD MANUAL STRIP-MCNC: 222 MG/DL (ref 74–99)
GLUCOSE SERPL-MCNC: 229 MG/DL (ref 74–99)
HCT VFR BLD AUTO: 39 % (ref 36–46)
HGB BLD-MCNC: 12.7 G/DL (ref 12–16)
HOLD SPECIMEN: NORMAL
IMM GRANULOCYTES # BLD AUTO: 0.06 X10*3/UL (ref 0–0.5)
IMM GRANULOCYTES NFR BLD AUTO: 0.7 % (ref 0–0.9)
LYMPHOCYTES # BLD AUTO: 2.09 X10*3/UL (ref 0.8–3)
LYMPHOCYTES NFR BLD AUTO: 24.9 %
MAGNESIUM SERPL-MCNC: 2.02 MG/DL (ref 1.6–2.4)
MCH RBC QN AUTO: 33.1 PG (ref 26–34)
MCHC RBC AUTO-ENTMCNC: 32.6 G/DL (ref 32–36)
MCV RBC AUTO: 102 FL (ref 80–100)
MONOCYTES # BLD AUTO: 0.53 X10*3/UL (ref 0.05–0.8)
MONOCYTES NFR BLD AUTO: 6.3 %
NEUTROPHILS # BLD AUTO: 5.55 X10*3/UL (ref 1.6–5.5)
NEUTROPHILS NFR BLD AUTO: 66 %
NRBC BLD-RTO: 0 /100 WBCS (ref 0–0)
P AXIS: 48 DEGREES
P AXIS: 50 DEGREES
P AXIS: 70 DEGREES
P OFFSET: 112 MS
P OFFSET: 131 MS
P OFFSET: 136 MS
P ONSET: 113 MS
P ONSET: 73 MS
P ONSET: 89 MS
PLATELET # BLD AUTO: 209 X10*3/UL (ref 150–450)
POTASSIUM SERPL-SCNC: 4.5 MMOL/L (ref 3.5–5.3)
PR INTERVAL: 190 MS
PR INTERVAL: 242 MS
PR INTERVAL: 256 MS
Q ONSET: 191 MS
Q ONSET: 193 MS
Q ONSET: 193 MS
QRS COUNT: 11 BEATS
QRS COUNT: 14 BEATS
QRS COUNT: 14 BEATS
QRS DURATION: 154 MS
QRS DURATION: 158 MS
QRS DURATION: 162 MS
QT INTERVAL: 428 MS
QT INTERVAL: 444 MS
QT INTERVAL: 452 MS
QTC CALCULATION(BAZETT): 488 MS
QTC CALCULATION(BAZETT): 509 MS
QTC CALCULATION(BAZETT): 524 MS
QTC FREDERICIA: 476 MS
QTC FREDERICIA: 480 MS
QTC FREDERICIA: 496 MS
R AXIS: -74 DEGREES
R AXIS: -83 DEGREES
R AXIS: -85 DEGREES
RBC # BLD AUTO: 3.84 X10*6/UL (ref 4–5.2)
SODIUM SERPL-SCNC: 139 MMOL/L (ref 136–145)
T AXIS: 117 DEGREES
T AXIS: 117 DEGREES
T AXIS: 126 DEGREES
T OFFSET: 407 MS
T OFFSET: 415 MS
T OFFSET: 417 MS
VENTRICULAR RATE: 70 BPM
VENTRICULAR RATE: 84 BPM
VENTRICULAR RATE: 85 BPM
WBC # BLD AUTO: 8.4 X10*3/UL (ref 4.4–11.3)

## 2024-06-27 PROCEDURE — 36415 COLL VENOUS BLD VENIPUNCTURE: CPT | Performed by: STUDENT IN AN ORGANIZED HEALTH CARE EDUCATION/TRAINING PROGRAM

## 2024-06-27 PROCEDURE — 93010 ELECTROCARDIOGRAM REPORT: CPT | Performed by: INTERNAL MEDICINE

## 2024-06-27 PROCEDURE — 99232 SBSQ HOSP IP/OBS MODERATE 35: CPT | Performed by: NURSE PRACTITIONER

## 2024-06-27 PROCEDURE — 83735 ASSAY OF MAGNESIUM: CPT | Performed by: STUDENT IN AN ORGANIZED HEALTH CARE EDUCATION/TRAINING PROGRAM

## 2024-06-27 PROCEDURE — 2500000002 HC RX 250 W HCPCS SELF ADMINISTERED DRUGS (ALT 637 FOR MEDICARE OP, ALT 636 FOR OP/ED): Performed by: NURSE PRACTITIONER

## 2024-06-27 PROCEDURE — 85025 COMPLETE CBC W/AUTO DIFF WBC: CPT | Performed by: STUDENT IN AN ORGANIZED HEALTH CARE EDUCATION/TRAINING PROGRAM

## 2024-06-27 PROCEDURE — 93005 ELECTROCARDIOGRAM TRACING: CPT

## 2024-06-27 PROCEDURE — 80048 BASIC METABOLIC PNL TOTAL CA: CPT | Performed by: STUDENT IN AN ORGANIZED HEALTH CARE EDUCATION/TRAINING PROGRAM

## 2024-06-27 PROCEDURE — 2500000004 HC RX 250 GENERAL PHARMACY W/ HCPCS (ALT 636 FOR OP/ED): Performed by: NURSE PRACTITIONER

## 2024-06-27 PROCEDURE — 2500000001 HC RX 250 WO HCPCS SELF ADMINISTERED DRUGS (ALT 637 FOR MEDICARE OP): Performed by: STUDENT IN AN ORGANIZED HEALTH CARE EDUCATION/TRAINING PROGRAM

## 2024-06-27 PROCEDURE — 82947 ASSAY GLUCOSE BLOOD QUANT: CPT | Mod: 91

## 2024-06-27 PROCEDURE — 82947 ASSAY GLUCOSE BLOOD QUANT: CPT

## 2024-06-27 PROCEDURE — 99239 HOSP IP/OBS DSCHRG MGMT >30: CPT | Performed by: HOSPITALIST

## 2024-06-27 RX ORDER — AMIODARONE HYDROCHLORIDE 200 MG/1
200 TABLET ORAL DAILY
Qty: 30 TABLET | Refills: 5 | Status: SHIPPED | OUTPATIENT
Start: 2024-06-28 | End: 2024-12-25

## 2024-06-27 ASSESSMENT — PAIN SCALES - GENERAL: PAINLEVEL_OUTOF10: 0 - NO PAIN

## 2024-06-27 NOTE — PROGRESS NOTES
Cardiology Progress Note  Patient: Twyla Waller  Unit/Bed: 918/918-A  YOB: 1950  MRN: [id number]  Acct: [id number]   Admitting Diagnosis:   Cystitis [N30.90]  Lightheaded [R42]  Pacemaker [Z95.0]  Near syncope [R55]  Fatigue, unspecified type [R53.83]  Date:  6/24/2024  Hospital Day: 2  Attending: Phillip Barahona MD   Rounding CATRACHITA/Cardiologist:  PATRICIA Fabian-CNP,        Primary Cardiologist: Dr. Ofelia Camargo      Complaint:  Chief Complaint   Patient presents with    Dizziness     Had issue with pacemaker in cardio office Friday. Had appointment today and BP was found low and HR variable 40-80        SUBJECTIVE    Up out of bed ambulating in room in young  Complains of weakness but denies dizziness or lightheadedness  Telemetry shows sinus rhythm AV paced with heart rate in the 70s        VITALS   Visit Vitals  /84 (BP Location: Right arm, Patient Position: Sitting)   Pulse 73   Temp 36.1 °C (97 °F) (Temporal)   Resp 18        I/O:  No intake or output data in the 24 hours ending 06/27/24 1320     Allergies:  Allergies   Allergen Reactions    Morphine Other     Mental Status Change    Verapamil Unknown    Calcium Channel Blocking Agents-Dihydropyridines Other     irregular heart rate    Latex Itching and Rash    Penicillins Rash    Ace Inhibitors Cough    Alendronate Unknown    Calcium Channel Blocking Agent Diltiazem Analogues Itching    Ibuprofen Unknown    Lisinopril Cough    Metformin Diarrhea    Rybelsus [Semaglutide] Diarrhea    Sertraline Unknown    Iodine Rash        PHYSICAL EXAM   Physical Exam  Constitutional:       Appearance: Normal appearance.   HENT:      Head: Normocephalic and atraumatic.      Nose: Nose normal.      Mouth/Throat:      Mouth: Mucous membranes are moist.   Eyes:      Conjunctiva/sclera: Conjunctivae normal.   Cardiovascular:      Rate and Rhythm: Normal rate and regular rhythm.      Pulses: Normal pulses.      Heart sounds: Normal heart sounds.   Pulmonary:       Effort: Pulmonary effort is normal.      Breath sounds: Normal breath sounds.   Abdominal:      General: Abdomen is flat.      Palpations: Abdomen is soft.   Musculoskeletal:         General: Normal range of motion.   Skin:     General: Skin is warm and dry.   Neurological:      General: No focal deficit present.      Mental Status: She is alert and oriented to person, place, and time.   Psychiatric:         Mood and Affect: Mood normal.         Behavior: Behavior normal.           LABS     Results for orders placed or performed during the hospital encounter of 06/24/24 (from the past 24 hour(s))   POCT GLUCOSE   Result Value Ref Range    POCT Glucose 203 (H) 74 - 99 mg/dL   POCT GLUCOSE   Result Value Ref Range    POCT Glucose 163 (H) 74 - 99 mg/dL   POCT GLUCOSE   Result Value Ref Range    POCT Glucose 232 (H) 74 - 99 mg/dL   CBC and Auto Differential   Result Value Ref Range    WBC 8.4 4.4 - 11.3 x10*3/uL    nRBC 0.0 0.0 - 0.0 /100 WBCs    RBC 3.84 (L) 4.00 - 5.20 x10*6/uL    Hemoglobin 12.7 12.0 - 16.0 g/dL    Hematocrit 39.0 36.0 - 46.0 %     (H) 80 - 100 fL    MCH 33.1 26.0 - 34.0 pg    MCHC 32.6 32.0 - 36.0 g/dL    RDW 15.1 (H) 11.5 - 14.5 %    Platelets 209 150 - 450 x10*3/uL    Neutrophils % 66.0 40.0 - 80.0 %    Immature Granulocytes %, Automated 0.7 0.0 - 0.9 %    Lymphocytes % 24.9 13.0 - 44.0 %    Monocytes % 6.3 2.0 - 10.0 %    Eosinophils % 1.5 0.0 - 6.0 %    Basophils % 0.6 0.0 - 2.0 %    Neutrophils Absolute 5.55 (H) 1.60 - 5.50 x10*3/uL    Immature Granulocytes Absolute, Automated 0.06 0.00 - 0.50 x10*3/uL    Lymphocytes Absolute 2.09 0.80 - 3.00 x10*3/uL    Monocytes Absolute 0.53 0.05 - 0.80 x10*3/uL    Eosinophils Absolute 0.13 0.00 - 0.40 x10*3/uL    Basophils Absolute 0.05 0.00 - 0.10 x10*3/uL   Basic Metabolic Panel   Result Value Ref Range    Glucose 229 (H) 74 - 99 mg/dL    Sodium 139 136 - 145 mmol/L    Potassium 4.5 3.5 - 5.3 mmol/L    Chloride 106 98 - 107 mmol/L     Bicarbonate 30 21 - 32 mmol/L    Anion Gap 8 (L) 10 - 20 mmol/L    Urea Nitrogen 30 (H) 6 - 23 mg/dL    Creatinine 1.14 (H) 0.50 - 1.05 mg/dL    eGFR 50 (L) >60 mL/min/1.73m*2    Calcium 8.0 (L) 8.6 - 10.3 mg/dL   Magnesium   Result Value Ref Range    Magnesium 2.02 1.60 - 2.40 mg/dL   SST TOP   Result Value Ref Range    Extra Tube Hold for add-ons.    POCT GLUCOSE   Result Value Ref Range    POCT Glucose 222 (H) 74 - 99 mg/dL   ECG 12 Lead   Result Value Ref Range    Ventricular Rate 70 BPM    Atrial Rate 70 BPM    GA Interval 190 ms    QRS Duration 162 ms    QT Interval 452 ms    QTC Calculation(Bazett) 488 ms    P Axis 70 degrees    R Axis -83 degrees    T Axis 117 degrees    QRS Count 11 beats    Q Onset 191 ms    P Onset 113 ms    P Offset 136 ms    T Offset 417 ms    QTC Fredericia 476 ms   POCT GLUCOSE   Result Value Ref Range    POCT Glucose 200 (H) 74 - 99 mg/dL        Scheduled medications  amiodarone, 200 mg, oral, Daily  apixaban, 5 mg, oral, BID  atorvastatin, 20 mg, oral, Nightly  gabapentin, 100 mg, oral, TID  insulin glargine, 10 Units, subcutaneous, Nightly  insulin lispro, 0-10 Units, subcutaneous, Before meals & nightly  metoprolol tartrate, 25 mg, oral, BID      Continuous medications     PRN medications  PRN medications: acetaminophen **OR** acetaminophen **OR** acetaminophen, dextrose, dextrose, glucagon, glucagon, meclizine, polyethylene glycol     ECG 12 Lead    Result Date: 6/27/2024  AV dual-paced rhythm Abnormal ECG When compared with ECG of 26-JUN-2024 10:00, (unconfirmed) Vent. rate has decreased BY  15 BPM    ECG 12 Lead    Result Date: 6/26/2024  AV dual-paced rhythm with prolonged AV conduction Abnormal ECG When compared with ECG of 25-JUN-2024 10:18, (unconfirmed) No significant change was found    ECG 12 lead    Result Date: 6/25/2024  AV dual-paced rhythm with prolonged AV conduction Abnormal ECG When compared with ECG of 06-JUN-2024 13:50, Vent. rate has decreased BY   6 BPM See  ED provider note for full interpretation and clinical correlation Confirmed by Jessi Lloyd ([id number]) on 6/25/2024 9:06:20 PM    ECG 12 Lead    Result Date: 6/25/2024  AV dual-paced rhythm with prolonged AV conduction Abnormal ECG When compared with ECG of 24-JUN-2024 12:53, (unconfirmed) Vent. rate has increased BY   3 BPM    CT head wo IV contrast    Result Date: 6/24/2024  Interpreted By:  Ai Mendoza, STUDY: CT HEAD WO IV CONTRAST;  6/24/2024 3:09 pm   INDICATION: Signs/Symptoms:weakness.   COMPARISON: None.   ACCESSION NUMBER(S): [id number]   ORDERING CLINICIAN: PRINCE FOURNIER   TECHNIQUE: Noncontrast axial CT scan of head was performed. Multiplanar reconstructions   FINDINGS: No acute intracranial hemorrhage, mass effect, midline shift, or herniation. No evidence of hydrocephalus. Periventricular and subcortical deep white matter hypodensity suggestive of chronic microangiopathic change. The ventricles and sulci are unremarkable for age. The visualized paranasal sinuses and mastoid air cells are clear. No acute osseous abnormality of the calvarium. No destructive bone lesion.       No acute intracranial abnormality. Consider follow-up with MRI as warranted.     Signed by: Ai Mendoza 6/24/2024 3:45 PM Dictation workstation:   [id number]    ECG 12 lead (Clinic Performed)    Result Date: 6/21/2024  See scan    Transesophageal Echo (SHIELA)    Result Date: 6/19/2024          [place] [id number]  Tel [phone] Fax [phone] TRANSESOPHAGEAL ECHOCARDIOGRAM REPORT  Patient Name:      SHAY Wu Physician:    [id number] Yaron Lara MD Study Date:        6/6/2024             Ordering Provider:    [id number] FORREST BRAXTON MRN/PID:           [id number]             Fellow: Accession#:        [id number]         Nurse:                 Anita Alfredito Date of Birth/Age: 1950 / 74 years Sonographer:          Coty Whittington [facility] Gender:            F                    Additional Staff: Height:            160.02 cm            Admit Date: Weight:            73.94 kg             Admission Status:     Outpatient BSA / BMI:         1.77 m2 / 28.87      Department Location:  [address] [place] [address]                    kg/m2 Blood Pressure: 167 /106 mmHg Study Type:    TRANSESOPHAGEAL ECHO (SHIELA) Diagnosis/ICD: Unspecified atrial fibrillation-I48.91 Indication:    A.Fib CPT Codes:     SHIELA Complete-34180; Moderate Sedation Services initial 15 minutes                patient >5 years-28152  Study Detail: The following Echo studies were performed: 2D, Doppler and color               flow. Agitated saline used as a contrast agent for intraseptal               flow evaluation.  PHYSICIAN INTERPRETATION: SHIELA Details: The SHIELA probe used was [id number]. Technically adequate omniplane transesophageal echocardiogram performed. Agitated saline contrast and color flow Doppler echo was performed to assess for the presence of a patent foramen ovale. SHIELA Medication: The pharynx was anesthetized with Cetacaine spray and viscous lidocaine. The patient was sedated using moderate sedation. Total intraservice time for moderate sedation was 24 minutes. Midazolam and Fentanyl was used to sedate the patient for this exam. SHIELA Procedure: The probe was passed without difficulty. The following complication was encountered during the procedure: Patient tolerated the procedure well without any apparent complications. Left Ventricle: Left ventricular systolic function is normal, with an estimated ejection fraction of 55-60%. There are no regional wall motion abnormalities. The left ventricular cavity size is normal. Left ventricular diastolic filling was indeterminate. Left Atrium: The left atrium is moderately dilated. There is no definite left atrial thrombus present. A bubble study using  agitated saline was performed. Bubble study is negative. The left atrial appendage is small, there is a normal sized left atrial appendage and there is no thrombus visualized in the left atrial appendage. Right Ventricle: The right ventricle is normal in size. There is normal right ventricular global systolic function. Right Atrium: The right atrium is mildly dilated. Aortic Valve: The aortic valve is trileaflet. There is mild aortic valve regurgitation. Mitral Valve: The mitral valve is mild to moderately thickened. There is moderate mitral valve regurgitation. Tricuspid Valve: The tricuspid valve is structurally normal. There is moderate tricuspid regurgitation. Pulmonic Valve: The pulmonic valve is not well visualized. There is no indication of pulmonic valve regurgitation. Pericardium: There is no pericardial effusion noted. Aorta: The aortic root is normal. There is plaque visualized in the descending aorta which is classified as a Grade 2 [mild (focal or diffuse) intimal thickening of 2-3 mm] atherosclerosis. Pulmonary Veins: The pulmonary veins appear normal and return normally to the left atrium.  CONCLUSIONS:  1. Left ventricular systolic function is normal with a 55-60% estimated ejection fraction.  2. The left atrium is moderately dilated.  3. Moderate mitral valve regurgitation.  4. Moderate tricuspid regurgitation.  5. Mild aortic valve regurgitation.  6. No left atrial thrombus.  7. There is plaque visualized in the descending aorta. QUANTITATIVE DATA SUMMARY: TRICUSPID VALVE/RVSP:                             Normal Ranges: Peak TR Velocity: 2.93 m/s RV Syst Pressure: 37.3 mmHg (< 30mmHg)  [id number] Yaron Lara MD Electronically signed on 6/8/2024 at 12:02:38 PM  ** Final **     ECG 12 lead    Result Date: 6/9/2024  Atrial-sensed ventricular-paced rhythm with prolonged AV conduction Abnormal ECG When compared with ECG of 06-JUN-2024 08:00, Vent. rate has increased BY  13 BPM Confirmed by Basim Hunter  ([id number]) on 6/9/2024 8:11:11 PM    Cardioversion external    Result Date: 6/6/2024  .Cardioversion Summary: External electric cardioversion of atrial fibrillation/ atrial flutter to AV paced rhythm was achieved. Recommendations: A 12 lead ECG should be performed prior to discharge from the hospital. The patient should continue with the present medications. DDD pacing at 90 ppm x 1 hour, then the device will be reprogrammed to her baseline settings before discharge. Discharge: The patient recovered uneventfully from the effects of conscious and deep sedation. The patient left the EP laboratory hemodynamically stable and without neurological deficits. Follow up: The patient will be discharged on the day of the procedure, following bed rest and subsequent ambulation, provided the recovery parameters are appropriate. The patient should call the electrophysiologist immediately if symptoms recur, or for any problems. The patient has  been instructed accordingly.  I attempted to call the patient's  with the provided number as well as the number in the electronic record, and neither number rings through. Message left with clinical staff to contact patient's . See signed procedural log and parameters. Procedures: Cardioversion.  Pre and post pacemaker analysis and reprogramming. Diagnosis: Atrial fibrillation. Atrial flutter. Patient history: Please refer to the detailed history and physical on the patient's medical chart. Procedure narrative: The risks, benefits, and alternatives to the procedure and sedation were explained to the patient, and informed consent was obtained. The patient was in the fasting state. A grounding pad was placed. Self-adhesive anterior-posterior defibrillation pads were applied. A ZOLL defibrillator was used for monitoring and the defibrillator waveform was set to biphasic. The patient was set up for continuous monitoring of surface 12 lead ECG, capnography, and pulse oximetry. Blood  pressure was monitored with automatic cuff measurements. The procedure was performed under IV conscious sedation supplemented with intermittent deep sedation. The device was analyzed and reprogrammed. External electric cardioversion of atrial fibrillation / atrial flutter to AV paced rhythm was achieved with 200 Joules via AP patches. The device was reprogrammed. Complications: The patient tolerated the procedure without any complications or incident.     ECG 12 lead    Result Date: 6/6/2024  Ventricular-paced rhythm Abnormal ECG When compared with ECG of 08-FEB-2023 12:06, Electronic ventricular pacemaker has replaced Sinus rhythm Confirmed by Basim Hunter ([id number]) on 6/6/2024 8:46:33 AM       Encounter Date: 06/24/24   ECG 12 Lead   Result Value    Ventricular Rate 70    Atrial Rate 70    MD Interval 190    QRS Duration 162    QT Interval 452    QTC Calculation(Bazett) 488    P Axis 70    R Axis -83    T Axis 117    QRS Count 11    Q Onset 191    P Onset 113    P Offset 136    T Offset 417    QTC Fredericia 476    Narrative    AV dual-paced rhythm  Abnormal ECG  When compared with ECG of 26-JUN-2024 10:00, (unconfirmed)  Vent. rate has decreased BY  15 BPM        Tele Monitoring: Sinus rhythm AV paced 70s/sinus rhythm AV paced heart rate 70 and QTc 488 ms    Assessment     Patient Active Problem List   Diagnosis    Abnormal laboratory test result    Age-related osteoporosis without current pathological fracture    Benign essential hypertension    BPPV (benign paroxysmal positional vertigo)    Cervical disc disease    Primary osteoarthritis of cervical spine    Change in bowel habits    Chest pain    Dependence on other enabling machines and devices    Diastolic heart failure (Multi)    Dizziness    Dysphagia    Fibromyalgia muscle pain    Fracture of humerus    Hyperlipidemia    Hypertensive heart and chronic kidney disease with heart failure and stage 1 through stage 4 chronic kidney disease, or unspecified  chronic kidney disease (Multi)    Hypoproteinemia (Multi)    Hypoxia    Iron deficiency anemia secondary to inadequate dietary iron intake    Irritable bowel syndrome with constipation    Localized osteoarthritis of knees, bilateral    Vitamin D deficiency    Hypertensive disorder    Venous insufficiency of both lower extremities    Type 2 diabetes mellitus (Multi)    Stage 3a chronic kidney disease (Multi)    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)    Renal calculi    PTSD (post-traumatic stress disorder)    Paranoid state, simple (Multi)    Pacemaker    Other obesity due to excess calories    Osteoporosis    Osteoarthritis of knee    Obstructive sleep apnea (adult) (pediatric)    Mixed stress and urge urinary incontinence    Migraine without aura    Lung nodule    Localized primary osteoarthritis of right lower leg    OAB (overactive bladder)    Low compliance bladder    AV block    Chronotropic incompetence    Depression    Dyspnea    High-grade atrioventricular block    Near syncope    Orthostatic hypotension    Overweight with body mass index (BMI) of 29 to 29.9 in adult    Sinus node dysfunction (Multi)    Encounter for medication review and counseling    Encounter to discuss test results    Never smoked any substance    Lightheaded    Fatigue, unspecified type    Atrial tachycardia (CMS-HCC)   Clinical impression:  1.  Recurrent atrial tachycardia status post recent cardioversion 6/6/2024  2.  Sinus node dysfunction with remote dual-chamber permanent pacemaker implant  3.  Hypertension  4.  Diabetes mellitus  5.  Abnormal urinalysis currently on IV antibiotics       Plan:  Remains sinus rhythm AV paced since device adjustments yesterday of rate response turned off.  Continue amiodarone 200 mg daily  Continue metoprolol tartrate 25 mg twice daily  Continue anticoagulation with Eliquis  Nando to discharge home per EP  Outpatient follow-up with EP service and device check in 4 weeks          Electronically  signed by PATRICIA Fabian-CNP on 6/27/2024 at 1:20 PM

## 2024-06-27 NOTE — DISCHARGE SUMMARY
Discharge Diagnosis  Atrial tachycardia     Discharge Meds     Your medication list        START taking these medications        Instructions Last Dose Given Next Dose Due   amiodarone 200 mg tablet  Commonly known as: Pacerone  Start taking on: June 28, 2024      Take 1 tablet (200 mg) by mouth once daily.              CONTINUE taking these medications        Instructions Last Dose Given Next Dose Due   acetaminophen 500 mg tablet  Commonly known as: Tylenol           albuterol 90 mcg/actuation inhaler           apixaban 5 mg tablet  Commonly known as: Eliquis      Take 1 tablet (5 mg) by mouth 2 times a day.       atorvastatin 20 mg tablet  Commonly known as: Lipitor           AZO CRANBERRY ORAL           Calcium 600 + D(3) 600 mg-5 mcg (200 unit) tablet  Generic drug: calcium carbonate-vitamin D3           cholecalciferol 50 mcg (2,000 unit) capsule  Commonly known as: Vitamin D-3           citalopram 20 mg tablet  Commonly known as: CeleXA           cyanocobalamin 1,000 mcg tablet  Commonly known as: Vitamin B-12           esomeprazole 20 mg DR capsule  Commonly known as: NexIUM           FIBER GUMMIES ORAL           fluticasone propion-salmeteroL 100-50 mcg/dose diskus inhaler  Commonly known as: Advair Diskus           furosemide 20 mg tablet  Commonly known as: Lasix           gabapentin 100 mg capsule  Commonly known as: Neurontin           insulin lispro 100 unit/mL injection  Commonly known as: HumaLOG           insulin lispro 100 unit/mL injection  Commonly known as: HumaLOG           Lactobacillus acidophilus 1 billion cell capsule           Lantus Solostar U-100 Insulin 100 unit/mL (3 mL) pen  Generic drug: insulin glargine           LINZESS ORAL           metoprolol tartrate 25 mg tablet  Commonly known as: Lopressor      Take 1 tablet (25 mg) by mouth 2 times a day.       potassium chloride CR 10 mEq ER tablet  Commonly known as: Klor-Con           Prolia 60 mg/mL syringe  Generic drug: denosumab            Rybelsus 14 mg tablet tablet  Generic drug: semaglutide           solifenacin 10 mg tablet  Commonly known as: VESIcare                     Where to Get Your Medications        These medications were sent to [facility] [address] [place] [address] [address] [facility] 53  [address] [address]      Phone: [phone]   amiodarone 200 mg tablet         Test Results Pending At Discharge  Pending Labs       No current pending labs.            Hospital Course: 74 year old female admitted with persistent atrial tachycardia and afib s/p SHIELA and DCCV.     -see by EP and planned cardioversion on 6/27 but converted to sinus rhythm, started on amiodarone  -continue eliquis, and follow up outpatient with EP    UTI: completed treatment in house    Discharged home in stable condition. Greater than 30 minutes of clinical time spent caring for this patient.      Pertinent Physical Exam At Time of Discharge  Gen: NAD  HEENT: EOM, MMM  CV: RRR, no murmurs rubs or gallops  Resp: coarse rhonchi b/l   Abdomen: soft, NT,+BS  LE: No edema      Outpatient Follow-Up  Future Appointments   Date Time Provider Department Center   7/24/2024 11:00 AM [facility] 1 [address]   7/24/2024 12:00 PM PATRICIA [facility] [id number] [place]   9/20/2024 12:00 PM [facility] 3 [address]   9/20/2024 12:40 PM Ofelia Camargo MD [id number] [place]         Phillip Barahona MD

## 2024-07-24 ENCOUNTER — HOSPITAL ENCOUNTER (OUTPATIENT)
Dept: CARDIOLOGY | Facility: HOSPITAL | Age: 74
Discharge: HOME | End: 2024-07-24
Payer: MEDICARE

## 2024-07-24 ENCOUNTER — APPOINTMENT (OUTPATIENT)
Dept: CARDIOLOGY | Facility: CLINIC | Age: 74
End: 2024-07-24
Payer: MEDICARE

## 2024-07-24 VITALS
SYSTOLIC BLOOD PRESSURE: 102 MMHG | DIASTOLIC BLOOD PRESSURE: 84 MMHG | HEIGHT: 63 IN | WEIGHT: 153 LBS | BODY MASS INDEX: 27.11 KG/M2

## 2024-07-24 DIAGNOSIS — I47.19 ATRIAL TACHYCARDIA (CMS-HCC): ICD-10-CM

## 2024-07-24 DIAGNOSIS — R29.6 FALLS FREQUENTLY: ICD-10-CM

## 2024-07-24 DIAGNOSIS — Z79.01 CURRENT USE OF LONG TERM ANTICOAGULATION: Primary | ICD-10-CM

## 2024-07-24 DIAGNOSIS — R53.1 WEAKNESS: ICD-10-CM

## 2024-07-24 DIAGNOSIS — I48.19 PERSISTENT ATRIAL FIBRILLATION (MULTI): ICD-10-CM

## 2024-07-24 DIAGNOSIS — R53.83 OTHER FATIGUE: ICD-10-CM

## 2024-07-24 DIAGNOSIS — R42 DIZZINESS: ICD-10-CM

## 2024-07-24 PROCEDURE — 3079F DIAST BP 80-89 MM HG: CPT | Performed by: NURSE PRACTITIONER

## 2024-07-24 PROCEDURE — 1159F MED LIST DOCD IN RCRD: CPT | Performed by: NURSE PRACTITIONER

## 2024-07-24 PROCEDURE — 1036F TOBACCO NON-USER: CPT | Performed by: NURSE PRACTITIONER

## 2024-07-24 PROCEDURE — 99215 OFFICE O/P EST HI 40 MIN: CPT | Performed by: NURSE PRACTITIONER

## 2024-07-24 PROCEDURE — 93280 PM DEVICE PROGR EVAL DUAL: CPT

## 2024-07-24 PROCEDURE — 93000 ELECTROCARDIOGRAM COMPLETE: CPT | Performed by: INTERNAL MEDICINE

## 2024-07-24 PROCEDURE — 1111F DSCHRG MED/CURRENT MED MERGE: CPT | Performed by: NURSE PRACTITIONER

## 2024-07-24 PROCEDURE — 3074F SYST BP LT 130 MM HG: CPT | Performed by: NURSE PRACTITIONER

## 2024-07-24 PROCEDURE — 1160F RVW MEDS BY RX/DR IN RCRD: CPT | Performed by: NURSE PRACTITIONER

## 2024-07-24 RX ORDER — METOPROLOL TARTRATE 25 MG/1
12.5 TABLET, FILM COATED ORAL 2 TIMES DAILY
Qty: 30 TABLET | Refills: 11 | Status: SHIPPED | OUTPATIENT
Start: 2024-07-24 | End: 2025-07-24

## 2024-07-24 NOTE — PROGRESS NOTES
"CARDIOLOGY OFFICE VISIT      CHIEF COMPLAINT  Chief Complaint   Patient presents with    Follow-up     Pacemaker follow up.     Chief complaint: \"I still do not feel well.\"  HISTORY OF PRESENT ILLNESS  HPI  History: The patient is a 74-year-old  female who is followed for syncope secondary to sinus node dysfunction and transient high degree AV block status post dual-chamber pacemaker implant on February 8, 2023.  She has a history of paroxysmal atrial fibrillation and atrial tachycardia.  She was admitted to [facility] on June 24, 2024 with complaints of fatigue and near syncope and noted to be in atrial tachycardia with heart rates of 70 to 120 bpm.  She was started on amiodarone 200 mg daily and remains on metoprolol 25 mg twice a day and Eliquis.  She underwent cardioversion on June 27, 2024 with restoration of sinus rhythm and presents to the office today for follow-up evaluation.  She states that she remains completely exhausted.  She is accompanied by her  who states that she has had 2 recent falls.  The patient reports an 1 fall she did strike the back of her head.  She denies neurological changes.  She does have a large bruise on her right arm.  She denies palpitations, chest pain, shortness of breath, abdominal distention, or lower extremity edema.  During her hospitalization her VIP was programmed off as well as the rate response on her pacemaker.  She is now persistently pacing in the ventricle.  She is accompanied by her  for today's office visit who echoes many of the patient's concerns.  He states that he has been monitoring her blood pressure at home and it has been running in the low 100s systolic.  Past Medical History  Past Medical History:   Diagnosis Date    Arrhythmia     A-fib    Diabetes mellitus (Multi)     Hyperlipidemia     Hypertension        Social History  Social History     Tobacco Use    Smoking status: Never    Smokeless tobacco: Never   Vaping " Use    Vaping status: Never Used   Substance Use Topics    Alcohol use: Not Currently    Drug use: Not Currently       Family History     Family History   Problem Relation Name Age of Onset    Colon cancer Mother      Hypertension Father      Aneurysm Father      Pancreatic cancer Sister          Allergies:  Allergies   Allergen Reactions    Morphine Other     Mental Status Change    Verapamil Unknown    Calcium Channel Blocking Agents-Dihydropyridines Other     irregular heart rate    Latex Itching and Rash    Penicillins Rash    Ace Inhibitors Cough    Alendronate Unknown    Calcium Channel Blocking Agent Diltiazem Analogues Itching    Ibuprofen Unknown    Lisinopril Cough    Metformin Diarrhea    Rybelsus [Semaglutide] Diarrhea    Sertraline Unknown    Iodine Rash        Outpatient Medications:  Current Outpatient Medications   Medication Instructions    acetaminophen (TYLENOL) 1,000 mg, oral, Nightly    albuterol 90 mcg/actuation inhaler 1 puff, inhalation, Every 4 hours PRN    amiodarone (PACERONE) 200 mg, oral, Daily    apixaban (ELIQUIS) 5 mg, oral, 2 times daily    atorvastatin (LIPITOR) 20 mg, oral, Nightly    calcium carbonate-vitamin D3 (Calcium 600 + D,3,) 600 mg-5 mcg (200 unit) tablet 1 tablet, oral, Nightly    cholecalciferol (VITAMIN D-3) 2,000 Units, oral, Daily RT    citalopram (CELEXA) 20 mg, oral, Every morning    cranberry fruit concentrate (AZO CRANBERRY ORAL) 1 tablet, oral, 2 times daily    cyanocobalamin (Vitamin B-12) 1,000 mcg tablet 1 tablet, oral, Daily    esomeprazole (NEXIUM) 20 mg, oral, 2 times daily PRN, Do not open capsule.    fluticasone propion-salmeteroL (Advair Diskus) 100-50 mcg/dose diskus inhaler 1 puff, inhalation, 2 times daily PRN, Patient uses PRN - Rinse mouth with water after use to reduce aftertaste and incidence of candidiasis. Do not swallow.    furosemide (LASIX) 20 mg, oral, Daily PRN    gabapentin (NEURONTIN) 100 mg, oral, 3 times daily PRN    insulin lispro  (HumaLOG) 100 unit/mL injection subcutaneous, 3 times daily before meals, Per Sliding Scale    insulin lispro (HUMALOG) 8 Units, subcutaneous, 3 times daily before meals, Plus Sliding Scale<BR>    inulin (FIBER GUMMIES ORAL) 1 tablet, oral, Daily    Lactobacillus acidophilus 1 billion cell capsule 1 tablet, oral, Daily    Lantus Solostar U-100 Insulin 22 Units, subcutaneous, Every morning    linaclotide (LINZESS ORAL) 1 capsule, oral, Daily PRN    metoprolol tartrate (LOPRESSOR) 25 mg, oral, 2 times daily    potassium chloride CR 10 mEq ER tablet 10 mEq, oral, 2 times daily    Prolia 60 mg, subcutaneous, Every 6 months    Rybelsus 14 mg, oral, Daily, 30 MINUTES BEFORE FIRST FOOD, BEVERAGE, OR OTHER MEDICATION OF THE DAY<BR>    solifenacin (VESIcare) 10 mg tablet 1 tablet, oral, Daily (0630)          REVIEW OF SYSTEMS  Review of Systems   All other systems reviewed and are negative.        VITALS  There were no vitals filed for this visit.    PHYSICAL EXAM  Vitals and nursing note reviewed.   Constitutional:       Appearance: Normal appearance.   HENT:      Head: Normocephalic.   Neck:      Vascular: No JVD. Carotid upstrokes II/IV.  Cardiovascular:      Rate and Rhythm: Normal rate and regular rhythm.      Pulses: Normal pulses.      Heart sounds: Normal S1 S2, no S3 S4.  No murmurs or rubs.  Pulmonary:      Effort: Pulmonary effort is normal. Respirations regular and nonlabored.     Breath sounds: Clear to auscultation posterior laterally.  Abdominal:      General: Bowel sounds are normal.      Palpations: Abdomen is soft.   Musculoskeletal:         General: Normal range of motion.      Cervical back: Normal range of motion.   Skin:     General: Skin is warm and dry.  Left subclavian pacemaker pocket is well-healed without redness swelling or drainage.  A large area of ecchymosis is noted on the dorsum of the right upper extremity.  Neurological:      General: No focal deficit present.      Mental Status: Alert and  oriented to person, place, and time.      Motor: Motor function is intact.   Psychiatric:         Attention and Perception: Attention and perception normal.         Mood and Affect: Mood and affect normal.         Speech: Speech normal.         Behavior: Behavior normal. Behavior is cooperative.         Thought Content: Thought content normal.         Cognition and Memory: Cognition and memory normal.     Labs and testing: Twelve-lead EKG reveals atrial sensing and ventricular pacing at 79 bpm.  QRS durations 156 ms,  ms, QTc 525 ms.  Device interrogation dated July 24, 2024 reveals atrial pacing 73% and ventricular pacing 99.99%.  Heart rate histograms reveal limited heart rate variability.  Rate response was programmed on following a short walk test.  No atrial or ventricular arrhythmic events were noted.  Estimated battery longevity is 7 years and 6 months.      ASSESSMENT AND PLAN    Clinical impressions:  1.  Syncope secondary to sinus node dysfunction and high degree AV block status post dual-chamber pacemaker implant ([facility] on February 8, 2023.  2.  Persistent atrial fibrillation and paroxysmal atrial tachycardia controlled on amiodarone, metoprolol and anticoagulated with Eliquis.    3.  Hypertension, controlled with a blood pressure today 102/64.  4.  Dyslipidemia on statin.  5.  Diabetes mellitus.  6.  Hypokalemia on potassium replacement.  7.  CTA of the chest dated November 29, 2022 revealing calcified mediastinal lymph nodes and calcified granulomas of the right upper lobe of the lung as well as liver and spleen.  8.  Overweight with a BMI of 27.10.  9.  Frequent falls due to lower extremity weakness and fatigue.    Recommendations:    1.  Review of the device interrogation reveals that the patient is ventricularly paced 100%.  Ventricular pacing may be contributing to the weakness and fatigue the patient is experiencing.  I contacted Lelia in the device clinic and discussed  my concerns.  Patient was instructed to return to the device clinic for reprogramming of the AV delay to reduce ventricular pacing.  Patient will undergo an in clinic device check on September 20, 2024 at 12 PM followed by an office visit with Dr. Camargo at 12:40 PM for reevaluation of symptoms post reprogramming of the device.  2.  A 2D echocardiogram will be obtained as soon as possible for reevaluation of ejection fraction with complaints of extreme fatigue and documented persistent right ventricular pacing.  3.  Carotid duplex scan will be obtained for evaluation complaints of dizziness and lightheadedness with frequent falls.  4.  The patient will be referred to Dr. Pugh for evaluation for possible watchman left atrial appendage device for prophylaxis from thromboembolic event.  5.  Patient was instructed to reduce the metoprolol from 25 mg twice a day to 12.5 mg twice a day due to complaints of fatigue, dizziness and lightheadedness.  Patient's  will continue to monitor the patient's blood pressure.    Evaluation and note by Michell Sheffield CNP  **Please excuse any errors in grammar or translation related to this dictation.  Voice recognition software was utilized to prepare this document.**                                Shared Decision Tool - Referral for Left Atrial Appendage Occlusion    My patient Twyla Waller 1950 has been evaluated by me and is referred to [facility] for a left atrial appendage implant due thromboembolic stroke risk from atrial fibrillation with CHADS2 score >= 2 or a [id number] score >= 3.    This patient is not a good candidate for long term anticoagulation for the following reason(s):    This patient however should be able to tolerate short term anticoagulation as necessary for LAAO device implant.  My Patient and I, the referring physician, have reviewed the following:  Yes  Atrial Fibrillation increases the risk of stroke.  Yes Anticoagulants or blood thinners help  reduce the risk of stroke for most people.  Yes    Blood thinners may cause minor or serious bleeding issues.  Yes  Blood thinners include the medications aspirin, warfarin, and NOAC (dabigatran, edoxaban, rivaroxaban, apixaban...), each with unique risk and safety profiles.  Yes We reviewed his/her anticoagulation history and previous experiences.  Yes We discussed lack of other alternative treatments available to prevent stroke risk.  Yes We discussed the LAAO device implant vs taking anticoagulation to reduce stroke risk.  Yes We referred the patient to a LAAO outpatient clinic for further explanation and consideration.          Yes The LAAO device has been adequately explained along with the risks and benefits of treatment. Alternative treatment has been discussed with all questions answered. After discussion of the above considerations, it has been decided to be evaluated for the LAAO therapies.    Reviewed and approved by FORREST MCFARLAND on 7/25/24 at 2:13 PM.

## 2024-08-16 ENCOUNTER — APPOINTMENT (OUTPATIENT)
Dept: CARDIOLOGY | Facility: CLINIC | Age: 74
End: 2024-08-16
Payer: MEDICARE

## 2024-09-04 ENCOUNTER — HOSPITAL ENCOUNTER (OUTPATIENT)
Dept: HOSPITAL 101 - LAB | Age: 74
Discharge: HOME | End: 2024-09-04
Payer: MEDICARE

## 2024-09-04 DIAGNOSIS — I10: Primary | ICD-10-CM

## 2024-09-04 LAB
ANION GAP: 10.9
BLOOD UREA NITROGEN: 16 MG/DL (ref 7–18)
CALCIUM: 9.1 MG/DL (ref 8.5–10.1)
CARBON DIOXIDE: 31 MMOL/L (ref 21–32)
CHLORIDE: 102 MMOL/L (ref 98–107)
CO2 BLD-SCNC: 31 MMOL/L (ref 21–32)
ESTIMATED GFR (AFRICAN AMERICA: 43 (ref 60–?)
ESTIMATED GFR (NON-AFRICAN AME: 35 (ref 60–?)
GLUCOSE BLD-MCNC: 161 MG/DL (ref 74–106)
POTASSIUM SERPLBLD-SCNC: 3.9 MMOL/L (ref 3.5–5.1)
POTASSIUM: 3.9 MMOL/L (ref 3.5–5.1)
SODIUM BLD-SCNC: 140 MMOL/L (ref 136–145)
SODIUM: 140 MMOL/L (ref 136–145)

## 2024-09-04 PROCEDURE — 36415 COLL VENOUS BLD VENIPUNCTURE: CPT

## 2024-09-04 PROCEDURE — 80048 BASIC METABOLIC PNL TOTAL CA: CPT

## 2024-09-20 ENCOUNTER — HOSPITAL ENCOUNTER (OUTPATIENT)
Dept: CARDIOLOGY | Facility: HOSPITAL | Age: 74
Discharge: HOME | End: 2024-09-20
Payer: MEDICARE

## 2024-09-20 ENCOUNTER — APPOINTMENT (OUTPATIENT)
Dept: CARDIOLOGY | Facility: CLINIC | Age: 74
End: 2024-09-20
Payer: MEDICARE

## 2024-09-20 VITALS — SYSTOLIC BLOOD PRESSURE: 120 MMHG | HEART RATE: 62 BPM | DIASTOLIC BLOOD PRESSURE: 72 MMHG

## 2024-09-20 DIAGNOSIS — I44.30 AV BLOCK: ICD-10-CM

## 2024-09-20 DIAGNOSIS — Z95.0 PACEMAKER: ICD-10-CM

## 2024-09-20 DIAGNOSIS — I44.39 HIGH-GRADE ATRIOVENTRICULAR BLOCK: ICD-10-CM

## 2024-09-20 DIAGNOSIS — I49.5 SINUS NODE DYSFUNCTION (MULTI): ICD-10-CM

## 2024-09-20 PROCEDURE — 1036F TOBACCO NON-USER: CPT | Performed by: INTERNAL MEDICINE

## 2024-09-20 PROCEDURE — 3074F SYST BP LT 130 MM HG: CPT | Performed by: INTERNAL MEDICINE

## 2024-09-20 PROCEDURE — 99215 OFFICE O/P EST HI 40 MIN: CPT | Performed by: INTERNAL MEDICINE

## 2024-09-20 PROCEDURE — 93000 ELECTROCARDIOGRAM COMPLETE: CPT | Mod: DISTINCT PROCEDURAL SERVICE | Performed by: INTERNAL MEDICINE

## 2024-09-20 PROCEDURE — 1159F MED LIST DOCD IN RCRD: CPT | Performed by: INTERNAL MEDICINE

## 2024-09-20 PROCEDURE — 93280 PM DEVICE PROGR EVAL DUAL: CPT | Performed by: INTERNAL MEDICINE

## 2024-09-20 PROCEDURE — 93280 PM DEVICE PROGR EVAL DUAL: CPT

## 2024-09-20 PROCEDURE — 3078F DIAST BP <80 MM HG: CPT | Performed by: INTERNAL MEDICINE

## 2024-09-20 RX ORDER — LIDOCAINE 50 MG/G
1 PATCH TOPICAL DAILY
COMMUNITY

## 2024-09-20 RX ORDER — RISPERIDONE 0.5 MG/1
0.5 TABLET ORAL NIGHTLY
COMMUNITY

## 2024-09-20 RX ORDER — AMLODIPINE BESYLATE 2.5 MG/1
2.5 TABLET ORAL DAILY
COMMUNITY

## 2024-09-20 RX ORDER — MIRTAZAPINE 15 MG/1
15 TABLET, FILM COATED ORAL NIGHTLY
COMMUNITY

## 2024-09-20 RX ORDER — CIPROFLOXACIN 250 MG/1
1 TABLET, FILM COATED ORAL
COMMUNITY
Start: 2024-09-18

## 2024-09-20 ASSESSMENT — ENCOUNTER SYMPTOMS
IRREGULAR HEARTBEAT: 0
WHEEZING: 0
ORTHOPNEA: 0
COUGH: 0
NEAR-SYNCOPE: 1
SYNCOPE: 0
PND: 0
SNORING: 0
CLAUDICATION: 0
SHORTNESS OF BREATH: 0

## 2024-09-20 NOTE — PROGRESS NOTES
Chief Complaint:   Follow-up (2 MOS)     History Of Present Illness:    Twyla Waller is a 74 y.o. female presenting with follow-up.  She is accompanied by her .    She was in the hospital for prolonged periods of time.    She is now trying to catch up with her medical appointments.    We previously referred her to Dr. Pugh for watchman, and she is agreeable.  By computer review, she will see Dr. Pugh in the near future.    Last Recorded Vitals:  Vitals:    09/20/24 1256   BP: 120/72   BP Location: Right arm   Patient Position: Sitting   Pulse: 62       Past Medical History:  See above  Past Surgical History:  See above  Social History:  She reports that she has never smoked. She has never used smokeless tobacco. She reports that she does not currently use alcohol. She reports that she does not currently use drugs.    Family History:  Family History   Problem Relation Name Age of Onset    Colon cancer Mother      Hypertension Father      Aneurysm Father      Pancreatic cancer Sister          Allergies:  Morphine, Verapamil, Calcium channel blocking agents-dihydropyridines, Latex, Penicillins, Ace inhibitors, Alendronate, Calcium channel blocking agent diltiazem analogues, Ibuprofen, Lisinopril, Metformin, Rybelsus [semaglutide], Sertraline, and Iodine    Outpatient Medications:  Current Outpatient Medications   Medication Instructions    acetaminophen (TYLENOL) 1,000 mg, oral, Nightly    albuterol 90 mcg/actuation inhaler 1 puff, inhalation, Every 4 hours PRN    amiodarone (PACERONE) 200 mg, oral, Daily    amLODIPine (NORVASC) 2.5 mg, oral, Daily    apixaban (ELIQUIS) 5 mg, oral, 2 times daily    atorvastatin (LIPITOR) 20 mg, oral, Nightly    cholecalciferol (VITAMIN D-3) 2,000 Units, oral, Daily RT    ciprofloxacin (Cipro) 250 mg tablet 1 tablet, oral, Every 12 hours scheduled (0630,1830)    citalopram (CELEXA) 40 mg, oral, Every morning    cyanocobalamin (Vitamin B-12) 1,000 mcg tablet 1 tablet, oral,  Daily    esomeprazole (NEXIUM) 20 mg, oral, 2 times daily PRN, Do not open capsule.    furosemide (LASIX) 20 mg, oral, Daily    gabapentin (NEURONTIN) 100 mg, oral, 3 times daily PRN    insulin lispro (HUMALOG) 8 Units, subcutaneous, 3 times daily before meals, Plus Sliding Scale<BR>    Lantus Solostar U-100 Insulin 22 Units, subcutaneous, Every morning    lidocaine (Lidoderm) 5 % patch 1 patch, transdermal, Daily, Remove & discard patch within 12 hours or as directed by MD.    metoprolol tartrate (LOPRESSOR) 12.5 mg, oral, 2 times daily    mirtazapine (REMERON) 15 mg, oral, Nightly    potassium chloride CR 10 mEq ER tablet 10 mEq, oral, 2 times daily    Prolia 60 mg, subcutaneous, Every 6 months    risperiDONE (RISPERDAL) 0.5 mg, oral, Nightly    Rybelsus 14 mg, oral, Daily, 30 MINUTES BEFORE FIRST FOOD, BEVERAGE, OR OTHER MEDICATION OF THE DAY<BR>    solifenacin (VESIcare) 10 mg tablet 1 tablet, oral, Daily (0630)   Review of Systems   Constitutional: Negative for malaise/fatigue.   Cardiovascular:  Positive for near-syncope. Negative for claudication, cyanosis, irregular heartbeat, leg swelling, orthopnea, paroxysmal nocturnal dyspnea and syncope.   Respiratory:  Negative for cough, shortness of breath, snoring and wheezing.    All other systems reviewed and are negative.        Physical Exam:  Constitutional:       Appearance: Normal and healthy appearance. Well-developed and not in distress.   Neck:      Vascular: No JVR. JVD normal.   Pulmonary:      Effort: Pulmonary effort is normal.      Breath sounds: Normal breath sounds. No wheezing. No rhonchi. No rales.   Chest:      Chest wall: Not tender to palpatation.   Cardiovascular:      PMI at left midclavicular line. Normal rate. Regular rhythm. Normal S1. Normal S2.       Murmurs: There is no murmur.      No gallop.  No click. No rub.   Pulses:     Intact distal pulses.   Edema:     Peripheral edema absent.   Abdominal:      Tenderness: There is no abdominal  "tenderness.   Musculoskeletal: Normal range of motion.         General: No tenderness. Skin:     General: Skin is warm and dry.   Neurological:      General: No focal deficit present.      Mental Status: Alert and oriented to person, place and time.            Last Labs:  CBC -  Lab Results   Component Value Date    WBC 8.4 06/27/2024    HGB 12.7 06/27/2024    HCT 39.0 06/27/2024     (H) 06/27/2024     06/27/2024       CMP -  Lab Results   Component Value Date    CALCIUM 8.0 (L) 06/27/2024    PHOS 3.3 06/25/2024    PROT 6.2 (L) 06/24/2024    ALBUMIN 3.5 06/24/2024    AST 22 06/24/2024    ALT 23 06/24/2024    ALKPHOS 71 06/24/2024    BILITOT 0.5 06/24/2024       LIPID PANEL -   No results found for: \"CHOL\", \"TRIG\", \"HDL\", \"CHHDL\", \"LDLF\", \"VLDL\", \"NHDL\"    RENAL FUNCTION PANEL -   Lab Results   Component Value Date    GLUCOSE 229 (H) 06/27/2024     06/27/2024    K 4.5 06/27/2024     06/27/2024    CO2 30 06/27/2024    ANIONGAP 8 (L) 06/27/2024    BUN 30 (H) 06/27/2024    CREATININE 1.14 (H) 06/27/2024    CALCIUM 8.0 (L) 06/27/2024    PHOS 3.3 06/25/2024    ALBUMIN 3.5 06/24/2024        Lab Results   Component Value Date    HGBA1C 7.3 (H) 07/29/2024       Last Cardiology Tests:  ECG:  ECG 12 lead (Clinic Performed) 07/24/2024      Today.  Normal sinus rhythm.  Normal axis.  Corrected QT interval 400 ms.  First-degree AV block.  LVH.  Previous inferior and anterior infarct.     Device check today.  [employer] 2272 pacemaker.  Estimated longevity device over 10 years.  1.2% ventricular pacing.  0 A-fib.  0 VT    Echo:  Transesophageal Echo (SHIELA) 06/06/2024      Stress Test:  Nuclear Stress Test 08/05/2024    Lab review: I have personally reviewed the laboratory result(s) see above    Assessment/Plan   Diagnoses and all orders for this visit:  Pacemaker    AV block    High-grade atrioventricular block    Sinus node dysfunction (Multi)          Ximena Kaur LPN      Syncope and " intermittent high grade AV block and sinus node dysfunction with pauses of 3 seconds. Intermittent complete heart block. Status post dual-chamber pacemaker March 2023  Sinus node dysfunction.  Chronotropic incompetence.  Walk test performed in past.  No indication to adjust sensor at this time.  [employer] PM 2272 pacemaker. Reviewed device check with patient and . All questions answered. Ordered device checks.  Dizziness and chronotropic incompetence.  Improved after adjustment of sensor.    Paroxysmal atrial fibrillation and persistent atrial tachycardia, recurred after cardioversion.  Tolerates amiodarone.  Defer to cardiology for amiodarone testing.  High risk medication amiodarone.  Monitor ECG twice yearly.  Falls and prone to fall.  Previously referred to Dr. Pugh for watchman evaluation.  Orthostatic hypotension. Improved with changing medication, timing of medication, and use of behavior modification in the past.  Overweight  AHA recommendations for exercise, diet, and behavioral modification reviewed with pt.     Counseling over 50% visit performed.  The patient, , and I discussed the mechanism of arrhythmia, pacemaker, pacemaker function, heart model, device check, ECG, amiodarone screening, referral to Dr. Pugh, indications for and types of medications, discussion if and what medication refills needed, treatment options, risks, benefits, and imponderables. American Heart Association lifestyle changes and behavioral modification discussed. All questions answered in detail. Patient appreciative of care.  Extended time of visit for discussion and review of model of the heart, what is Watchman, and atrial fibrillation/tachycardia

## 2024-09-20 NOTE — PATIENT INSTRUCTIONS
In 6 months, we will have you see Trinidad Bergeron NP, at our [place] location, for general cardiology.  Dr. Alvarado will ask her to handle your screening labs for Amiodarone.  In 1 year, you will return to see Dr. Alvarado, and have an in- clinic device check done.  Remote device checks will be done at 3, 6, and 9 months from today.  You have mentioned Dr. Robertson is having a nurse practitioner come to the house to go over your medicines by Tuesday of next week, to help sort out your medicines.  Please have her fax us a list of your current, up to date medicine list to [phone].  This needs to include each drug name, dose, and directions for how you take it.  You ARE supposed to be taking Amiodarone 200mg once a day.  Your appointment with Dr. Pugh for the evaluation for the Watchman device is on 10/9/24 @ 2PM, here in the [facility] in [place], [address].    I, CHYNA MARTINEZ LPN, AM SCRIBING FOR AND IN THE PRESENCE OF DR. BRAD ALVARADO MD, FACC, FACP, RS

## 2024-10-09 ENCOUNTER — LAB (OUTPATIENT)
Dept: LAB | Facility: LAB | Age: 74
End: 2024-10-09
Payer: MEDICARE

## 2024-10-09 ENCOUNTER — OFFICE VISIT (OUTPATIENT)
Dept: CARDIOLOGY | Facility: CLINIC | Age: 74
End: 2024-10-09
Payer: MEDICARE

## 2024-10-09 VITALS
HEART RATE: 60 BPM | DIASTOLIC BLOOD PRESSURE: 60 MMHG | OXYGEN SATURATION: 98 % | HEIGHT: 64 IN | WEIGHT: 151 LBS | SYSTOLIC BLOOD PRESSURE: 109 MMHG | BODY MASS INDEX: 25.78 KG/M2 | TEMPERATURE: 96.5 F | RESPIRATION RATE: 14 BRPM

## 2024-10-09 DIAGNOSIS — I48.91 ATRIAL FIBRILLATION, UNSPECIFIED TYPE (MULTI): ICD-10-CM

## 2024-10-09 DIAGNOSIS — I48.0 PAROXYSMAL ATRIAL FIBRILLATION (MULTI): Primary | ICD-10-CM

## 2024-10-09 LAB
ANION GAP SERPL CALC-SCNC: 11 MMOL/L (ref 10–20)
BUN SERPL-MCNC: 22 MG/DL (ref 6–23)
CALCIUM SERPL-MCNC: 9.1 MG/DL (ref 8.6–10.3)
CHLORIDE SERPL-SCNC: 100 MMOL/L (ref 98–107)
CO2 SERPL-SCNC: 34 MMOL/L (ref 21–32)
CREAT SERPL-MCNC: 1.12 MG/DL (ref 0.5–1.05)
EGFRCR SERPLBLD CKD-EPI 2021: 51 ML/MIN/1.73M*2
ERYTHROCYTE [DISTWIDTH] IN BLOOD BY AUTOMATED COUNT: 14.7 % (ref 11.5–14.5)
GLUCOSE SERPL-MCNC: 93 MG/DL (ref 74–99)
HCT VFR BLD AUTO: 38.2 % (ref 36–46)
HGB BLD-MCNC: 12.1 G/DL (ref 12–16)
MCH RBC QN AUTO: 33.5 PG (ref 26–34)
MCHC RBC AUTO-ENTMCNC: 31.7 G/DL (ref 32–36)
MCV RBC AUTO: 106 FL (ref 80–100)
NRBC BLD-RTO: 0 /100 WBCS (ref 0–0)
PLATELET # BLD AUTO: 291 X10*3/UL (ref 150–450)
POTASSIUM SERPL-SCNC: 4.5 MMOL/L (ref 3.5–5.3)
RBC # BLD AUTO: 3.61 X10*6/UL (ref 4–5.2)
SODIUM SERPL-SCNC: 140 MMOL/L (ref 136–145)
WBC # BLD AUTO: 8.5 X10*3/UL (ref 4.4–11.3)

## 2024-10-09 PROCEDURE — 3074F SYST BP LT 130 MM HG: CPT | Performed by: INTERNAL MEDICINE

## 2024-10-09 PROCEDURE — 36415 COLL VENOUS BLD VENIPUNCTURE: CPT

## 2024-10-09 PROCEDURE — 99204 OFFICE O/P NEW MOD 45 MIN: CPT | Performed by: INTERNAL MEDICINE

## 2024-10-09 PROCEDURE — 3078F DIAST BP <80 MM HG: CPT | Performed by: INTERNAL MEDICINE

## 2024-10-09 PROCEDURE — 80048 BASIC METABOLIC PNL TOTAL CA: CPT

## 2024-10-09 PROCEDURE — 99214 OFFICE O/P EST MOD 30 MIN: CPT | Performed by: INTERNAL MEDICINE

## 2024-10-09 PROCEDURE — 85027 COMPLETE CBC AUTOMATED: CPT

## 2024-10-09 PROCEDURE — 1159F MED LIST DOCD IN RCRD: CPT | Performed by: INTERNAL MEDICINE

## 2024-10-09 NOTE — PROGRESS NOTES
PCP: Dr. Robertson  Cardio: Dr. Camargo/FRANTZ Monterroso    I was asked by Dr. Camargo/FRANTZ Monterroso to evaluate this patient in consultation for evaluation of left atrial appendage closure.    The patient is a 74 y.o.  female with PMH of syncope and high-grade AVB with sinus node dysfunction, s/p PPM, paroxysmal Afib  with recurrence s/p cardioversion, currently on Eliquis and Amiodarone, orthostatic hypotension. The patient has normal LVEF.    She reports multiple falls in the last year (about 6 times) with the last one in 8/2024, requiring hospitalization and need for skilled nursing facility for further rehabilitation. She has often hit her head when falling but reportedly only suffered minor bruises. She denies significant events of bleeding.    Given the patient's significant fall risk,  the patient is referred for consideration of left atrial appendage closure for stroke risk reduction.       ROS:  Constitutional: no fatigue, no fevers, no body aches  Eyes: no acute eye problems, no blurred vision, no diplopia, no eye pain  ENT:  no acute hearing loss, no earache, no sore throat  Cardiovascular: dyspnea on exertion, no chest pain  Respiratory: no chronic cough, not coughing up sputum, no wheezing that is consistent with asthma  Gastrointestinal: no acute bowel complaints  Musculoskeletal: no acute arthralgias, no acute myalgias, no acute joint swelling  Skin: no skin rashes, no change in skin color and pigmentation, no skin lesions and no skin lumps.   Neurological: no headaches, no dizziness, no tingling, no fainting and no limb weakness.   Psychiatric:  no suicidal ideation, no confusion, no personality change and no emotional problems.   Hematologic/Lymphatic: no bleeding issues, other then mentioned in HPI  All other systems have been reviewed and are negative for complaint.     Physical Exam:     Visit Vitals  /60   Pulse 60   Temp 35.8 °C (96.5 °F)   Resp 14   Ht  "1.626 m (5' 4\")   Wt 68.5 kg (151 lb)   SpO2 98%   BMI 25.92 kg/m²   Smoking Status Never   BSA 1.76 m²        Constitutional: alert and in no acute distress.   Eyes: no erythema, swelling or discharge from the eye .   Ears, Nose, Mouth, and Throat: external inspection of ears and nose is normal , lips, teeth, and gums are normal with good dentition  and oropharynx normal with no erythema, edema, exudate or lesions .   Neck: neck is supple, symmetric, trachea midline, no masses  and no thyromegaly .   Pulmonary: no increased work of breathing or signs of respiratory distress , lungs clear to auscultation. , normal percussion of chest  and chest palpation normal .   Cardiovascular: RRR, no murmur,  no leg edema, non-displaced PMI, no S3 or S4  Abdomen: abdomen non-tender, no masses  and no hepatomegaly .           Skin:  no skin lesions          Neurologic: non-focal neurologic examination.      Psychiatric judgment and insight is normal , oriented to person, place and time , normal mood and affect .       Labs:    Results for orders placed or performed during the hospital encounter of 06/24/24   CBC and Auto Differential   Result Value Ref Range    WBC 11.7 (H) 4.4 - 11.3 x10*3/uL    nRBC 0.0 0.0 - 0.0 /100 WBCs    RBC 4.49 4.00 - 5.20 x10*6/uL    Hemoglobin 15.0 12.0 - 16.0 g/dL    Hematocrit 44.6 36.0 - 46.0 %    MCV 99 80 - 100 fL    MCH 33.4 26.0 - 34.0 pg    MCHC 33.6 32.0 - 36.0 g/dL    RDW 14.5 11.5 - 14.5 %    Platelets 259 150 - 450 x10*3/uL    Neutrophils % 77.9 40.0 - 80.0 %    Immature Granulocytes %, Automated 0.4 0.0 - 0.9 %    Lymphocytes % 16.0 13.0 - 44.0 %    Monocytes % 5.1 2.0 - 10.0 %    Eosinophils % 0.3 0.0 - 6.0 %    Basophils % 0.3 0.0 - 2.0 %    Neutrophils Absolute 9.06 (H) 1.60 - 5.50 x10*3/uL    Immature Granulocytes Absolute, Automated 0.05 0.00 - 0.50 x10*3/uL    Lymphocytes Absolute 1.87 0.80 - 3.00 x10*3/uL    Monocytes Absolute 0.60 0.05 - 0.80 x10*3/uL    Eosinophils Absolute 0.04 " 0.00 - 0.40 x10*3/uL    Basophils Absolute 0.04 0.00 - 0.10 x10*3/uL   Comprehensive Metabolic Panel   Result Value Ref Range    Glucose 187 (H) 74 - 99 mg/dL    Sodium 138 136 - 145 mmol/L    Potassium 3.9 3.5 - 5.3 mmol/L    Chloride 102 98 - 107 mmol/L    Bicarbonate 30 21 - 32 mmol/L    Anion Gap 10 10 - 20 mmol/L    Urea Nitrogen 31 (H) 6 - 23 mg/dL    Creatinine 1.19 (H) 0.50 - 1.05 mg/dL    eGFR 48 (L) >60 mL/min/1.73m*2    Calcium 9.5 8.6 - 10.3 mg/dL    Albumin 3.5 3.4 - 5.0 g/dL    Alkaline Phosphatase 71 33 - 136 U/L    Total Protein 6.2 (L) 6.4 - 8.2 g/dL    AST 22 9 - 39 U/L    Bilirubin, Total 0.5 0.0 - 1.2 mg/dL    ALT 23 7 - 45 U/L   Magnesium   Result Value Ref Range    Magnesium 1.48 (L) 1.60 - 2.40 mg/dL   Phosphorus   Result Value Ref Range    Phosphorus 2.2 (L) 2.5 - 4.9 mg/dL   Creatine Kinase   Result Value Ref Range    Creatine Kinase 15 0 - 215 U/L   Urinalysis with Reflex Microscopic   Result Value Ref Range    Color, Urine Light-Yellow Light-Yellow, Yellow, Dark-Yellow    Appearance, Urine Clear Clear    Specific Gravity, Urine 1.021 1.005 - 1.035    pH, Urine 6.0 5.0, 5.5, 6.0, 6.5, 7.0, 7.5, 8.0    Protein, Urine NEGATIVE NEGATIVE, 10 (TRACE), 20 (TRACE) mg/dL    Glucose, Urine Normal Normal mg/dL    Blood, Urine NEGATIVE NEGATIVE    Ketones, Urine NEGATIVE NEGATIVE mg/dL    Bilirubin, Urine NEGATIVE NEGATIVE    Urobilinogen, Urine Normal Normal mg/dL    Nitrite, Urine NEGATIVE NEGATIVE    Leukocyte Esterase, Urine 250 Alejandra/µL (A) NEGATIVE   Troponin I, High Sensitivity, Initial   Result Value Ref Range    Troponin I, High Sensitivity 9 0 - 13 ng/L   Troponin, High Sensitivity, 1 Hour   Result Value Ref Range    Troponin I, High Sensitivity 8 0 - 13 ng/L   Urine Gray Tube   Result Value Ref Range    Extra Tube Hold for add-ons.    Microscopic Only, Urine   Result Value Ref Range    WBC, Urine 21-50 (A) 1-5, NONE /HPF    RBC, Urine 3-5 NONE, 1-2, 3-5 /HPF    Squamous Epithelial Cells,  Urine 1-9 (SPARSE) Reference range not established. /HPF    Bacteria, Urine 1+ (A) NONE SEEN /HPF   CBC and Auto Differential   Result Value Ref Range    WBC 10.2 4.4 - 11.3 x10*3/uL    nRBC 0.0 0.0 - 0.0 /100 WBCs    RBC 4.19 4.00 - 5.20 x10*6/uL    Hemoglobin 13.9 12.0 - 16.0 g/dL    Hematocrit 41.7 36.0 - 46.0 %     80 - 100 fL    MCH 33.2 26.0 - 34.0 pg    MCHC 33.3 32.0 - 36.0 g/dL    RDW 14.6 (H) 11.5 - 14.5 %    Platelets 235 150 - 450 x10*3/uL    Neutrophils % 70.1 40.0 - 80.0 %    Immature Granulocytes %, Automated 0.5 0.0 - 0.9 %    Lymphocytes % 22.7 13.0 - 44.0 %    Monocytes % 5.5 2.0 - 10.0 %    Eosinophils % 0.8 0.0 - 6.0 %    Basophils % 0.4 0.0 - 2.0 %    Neutrophils Absolute 7.12 (H) 1.60 - 5.50 x10*3/uL    Immature Granulocytes Absolute, Automated 0.05 0.00 - 0.50 x10*3/uL    Lymphocytes Absolute 2.30 0.80 - 3.00 x10*3/uL    Monocytes Absolute 0.56 0.05 - 0.80 x10*3/uL    Eosinophils Absolute 0.08 0.00 - 0.40 x10*3/uL    Basophils Absolute 0.04 0.00 - 0.10 x10*3/uL   Basic Metabolic Panel   Result Value Ref Range    Glucose 120 (H) 74 - 99 mg/dL    Sodium 141 136 - 145 mmol/L    Potassium 3.3 (L) 3.5 - 5.3 mmol/L    Chloride 105 98 - 107 mmol/L    Bicarbonate 29 21 - 32 mmol/L    Anion Gap 10 10 - 20 mmol/L    Urea Nitrogen 26 (H) 6 - 23 mg/dL    Creatinine 0.93 0.50 - 1.05 mg/dL    eGFR 64 >60 mL/min/1.73m*2    Calcium 8.4 (L) 8.6 - 10.3 mg/dL   Magnesium   Result Value Ref Range    Magnesium 1.86 1.60 - 2.40 mg/dL   Phosphorus   Result Value Ref Range    Phosphorus 3.3 2.5 - 4.9 mg/dL   SST TOP   Result Value Ref Range    Extra Tube Hold for add-ons.    CBC and Auto Differential   Result Value Ref Range    WBC 9.0 4.4 - 11.3 x10*3/uL    nRBC 0.0 0.0 - 0.0 /100 WBCs    RBC 4.27 4.00 - 5.20 x10*6/uL    Hemoglobin 14.2 12.0 - 16.0 g/dL    Hematocrit 43.4 36.0 - 46.0 %     (H) 80 - 100 fL    MCH 33.3 26.0 - 34.0 pg    MCHC 32.7 32.0 - 36.0 g/dL    RDW 15.2 (H) 11.5 - 14.5 %     Platelets 218 150 - 450 x10*3/uL    Neutrophils % 63.5 40.0 - 80.0 %    Immature Granulocytes %, Automated 0.6 0.0 - 0.9 %    Lymphocytes % 27.1 13.0 - 44.0 %    Monocytes % 6.7 2.0 - 10.0 %    Eosinophils % 1.5 0.0 - 6.0 %    Basophils % 0.6 0.0 - 2.0 %    Neutrophils Absolute 5.70 (H) 1.60 - 5.50 x10*3/uL    Immature Granulocytes Absolute, Automated 0.05 0.00 - 0.50 x10*3/uL    Lymphocytes Absolute 2.43 0.80 - 3.00 x10*3/uL    Monocytes Absolute 0.60 0.05 - 0.80 x10*3/uL    Eosinophils Absolute 0.13 0.00 - 0.40 x10*3/uL    Basophils Absolute 0.05 0.00 - 0.10 x10*3/uL   Basic Metabolic Panel   Result Value Ref Range    Glucose 177 (H) 74 - 99 mg/dL    Sodium 139 136 - 145 mmol/L    Potassium 3.9 3.5 - 5.3 mmol/L    Chloride 106 98 - 107 mmol/L    Bicarbonate 28 21 - 32 mmol/L    Anion Gap 9 (L) 10 - 20 mmol/L    Urea Nitrogen 30 (H) 6 - 23 mg/dL    Creatinine 1.00 0.50 - 1.05 mg/dL    eGFR 59 (L) >60 mL/min/1.73m*2    Calcium 8.0 (L) 8.6 - 10.3 mg/dL   SST TOP   Result Value Ref Range    Extra Tube Hold for add-ons.    Magnesium   Result Value Ref Range    Magnesium 2.28 1.60 - 2.40 mg/dL   CBC and Auto Differential   Result Value Ref Range    WBC 8.4 4.4 - 11.3 x10*3/uL    nRBC 0.0 0.0 - 0.0 /100 WBCs    RBC 3.84 (L) 4.00 - 5.20 x10*6/uL    Hemoglobin 12.7 12.0 - 16.0 g/dL    Hematocrit 39.0 36.0 - 46.0 %     (H) 80 - 100 fL    MCH 33.1 26.0 - 34.0 pg    MCHC 32.6 32.0 - 36.0 g/dL    RDW 15.1 (H) 11.5 - 14.5 %    Platelets 209 150 - 450 x10*3/uL    Neutrophils % 66.0 40.0 - 80.0 %    Immature Granulocytes %, Automated 0.7 0.0 - 0.9 %    Lymphocytes % 24.9 13.0 - 44.0 %    Monocytes % 6.3 2.0 - 10.0 %    Eosinophils % 1.5 0.0 - 6.0 %    Basophils % 0.6 0.0 - 2.0 %    Neutrophils Absolute 5.55 (H) 1.60 - 5.50 x10*3/uL    Immature Granulocytes Absolute, Automated 0.06 0.00 - 0.50 x10*3/uL    Lymphocytes Absolute 2.09 0.80 - 3.00 x10*3/uL    Monocytes Absolute 0.53 0.05 - 0.80 x10*3/uL    Eosinophils Absolute  0.13 0.00 - 0.40 x10*3/uL    Basophils Absolute 0.05 0.00 - 0.10 x10*3/uL   Basic Metabolic Panel   Result Value Ref Range    Glucose 229 (H) 74 - 99 mg/dL    Sodium 139 136 - 145 mmol/L    Potassium 4.5 3.5 - 5.3 mmol/L    Chloride 106 98 - 107 mmol/L    Bicarbonate 30 21 - 32 mmol/L    Anion Gap 8 (L) 10 - 20 mmol/L    Urea Nitrogen 30 (H) 6 - 23 mg/dL    Creatinine 1.14 (H) 0.50 - 1.05 mg/dL    eGFR 50 (L) >60 mL/min/1.73m*2    Calcium 8.0 (L) 8.6 - 10.3 mg/dL   Magnesium   Result Value Ref Range    Magnesium 2.02 1.60 - 2.40 mg/dL   SST TOP   Result Value Ref Range    Extra Tube Hold for add-ons.    POCT GLUCOSE   Result Value Ref Range    POCT Glucose 279 (H) 74 - 99 mg/dL   POCT GLUCOSE   Result Value Ref Range    POCT Glucose 215 (H) 74 - 99 mg/dL   POCT GLUCOSE   Result Value Ref Range    POCT Glucose 174 (H) 74 - 99 mg/dL   POCT GLUCOSE   Result Value Ref Range    POCT Glucose 219 (H) 74 - 99 mg/dL   POCT GLUCOSE   Result Value Ref Range    POCT Glucose 173 (H) 74 - 99 mg/dL   POCT GLUCOSE   Result Value Ref Range    POCT Glucose 319 (H) 74 - 99 mg/dL   POCT GLUCOSE   Result Value Ref Range    POCT Glucose 203 (H) 74 - 99 mg/dL   POCT GLUCOSE   Result Value Ref Range    POCT Glucose 163 (H) 74 - 99 mg/dL   POCT GLUCOSE   Result Value Ref Range    POCT Glucose 232 (H) 74 - 99 mg/dL   POCT GLUCOSE   Result Value Ref Range    POCT Glucose 222 (H) 74 - 99 mg/dL   POCT GLUCOSE   Result Value Ref Range    POCT Glucose 200 (H) 74 - 99 mg/dL   ECG 12 lead   Result Value Ref Range    Ventricular Rate 81 BPM    Atrial Rate 81 BPM    VT Interval 246 ms    QRS Duration 146 ms    QT Interval 432 ms    QTC Calculation(Bazett) 501 ms    P Axis 61 degrees    R Axis -79 degrees    T Axis 121 degrees    QRS Count 14 beats    Q Onset 195 ms    P Onset 71 ms    P Offset 107 ms    T Offset 411 ms    QTC Fredericia 477 ms   ECG 12 Lead   Result Value Ref Range    Ventricular Rate 84 BPM    Atrial Rate 84 BPM    VT Interval 256  ms    QRS Duration 158 ms    QT Interval 444 ms    QTC Calculation(Bazett) 524 ms    P Axis 48 degrees    R Axis -85 degrees    T Axis 117 degrees    QRS Count 14 beats    Q Onset 193 ms    P Onset 89 ms    P Offset 112 ms    T Offset 415 ms    QTC Fredericia 496 ms   ECG 12 Lead   Result Value Ref Range    Ventricular Rate 85 BPM    Atrial Rate 85 BPM    MT Interval 242 ms    QRS Duration 154 ms    QT Interval 428 ms    QTC Calculation(Bazett) 509 ms    P Axis 50 degrees    R Axis -74 degrees    T Axis 126 degrees    QRS Count 14 beats    Q Onset 193 ms    P Onset 73 ms    P Offset 131 ms    T Offset 407 ms    QTC Fredericia 480 ms   ECG 12 Lead   Result Value Ref Range    Ventricular Rate 70 BPM    Atrial Rate 70 BPM    MT Interval 190 ms    QRS Duration 162 ms    QT Interval 452 ms    QTC Calculation(Bazett) 488 ms    P Axis 70 degrees    R Axis -83 degrees    T Axis 117 degrees    QRS Count 11 beats    Q Onset 191 ms    P Onset 113 ms    P Offset 136 ms    T Offset 417 ms    QTC Fredericia 476 ms          Medications:    Current Outpatient Medications   Medication Instructions    acetaminophen (TYLENOL) 1,000 mg, oral, Nightly    albuterol 90 mcg/actuation inhaler 1 puff, inhalation, Every 4 hours PRN    amiodarone (PACERONE) 200 mg, oral, Daily    amLODIPine (NORVASC) 2.5 mg, oral, Daily    apixaban (ELIQUIS) 5 mg, oral, 2 times daily    atorvastatin (LIPITOR) 20 mg, oral, Nightly    cholecalciferol (VITAMIN D-3) 2,000 Units, oral, Daily RT    ciprofloxacin (Cipro) 250 mg tablet 1 tablet, oral, Every 12 hours scheduled (0630,1830)    citalopram (CELEXA) 40 mg, oral, Every morning    cyanocobalamin (Vitamin B-12) 1,000 mcg tablet 1 tablet, oral, Daily    esomeprazole (NEXIUM) 20 mg, oral, 2 times daily PRN, Do not open capsule.    furosemide (LASIX) 20 mg, oral, Daily    gabapentin (NEURONTIN) 100 mg, oral, 3 times daily PRN    insulin lispro (HUMALOG) 8 Units, subcutaneous, 3 times daily before meals, Plus  Sliding Scale<BR>    Lantus Solostar U-100 Insulin 22 Units, subcutaneous, Every morning    lidocaine (Lidoderm) 5 % patch 1 patch, transdermal, Daily, Remove & discard patch within 12 hours or as directed by MD.    metoprolol tartrate (LOPRESSOR) 12.5 mg, oral, 2 times daily    mirtazapine (REMERON) 15 mg, oral, Nightly    potassium chloride CR 10 mEq ER tablet 10 mEq, oral, 2 times daily    Prolia 60 mg, subcutaneous, Every 6 months    risperiDONE (RISPERDAL) 0.5 mg, oral, Nightly    Rybelsus 14 mg, oral, Daily, 30 MINUTES BEFORE FIRST FOOD, BEVERAGE, OR OTHER MEDICATION OF THE DAY<BR>    solifenacin (VESIcare) 10 mg tablet 1 tablet, oral, Daily (0630)          Assessment/Plan:      This is a 74 y.o. female with PMH of syncope and high-grade AVB with sinus node dysfunction, s/p PPM, paroxysmal Afib  with recurrence s/p cardioversion, currently on Eliquis and Amiodarone, orthostatic hypotension. Given the patient's significant fall risk,  the patient is referred for consideration of left atrial appendage closure for stroke risk reduction.    The CHADS-VASC score is 6 and HAS-BLED score is 4. The patient is at increased risk of both bleeding and stroke.  As such the patient is a reasonable candidate for consideration of left atrial appendage occluder placement.    Today we discussed the left atrial appendage closure procedure. The patient was given written educational handout materials and watched an educational video. All risks, benefits and alternative were discussed.     The risks discussed included but were not limited to vascular complications, sedation related complications, risk of MI, CVA, device embolization, pericardial tamponade and death. The patient verbalized understanding and decided to proceed.         The patient requires CT for planning and to exclude TEX thrombus. In addition, the patient will also need a CT scan 4 months after the procedure, to evaluate the device for position, thrombus and  jose c-device leak. Following device implant, strategy will be for dual antiplatelet therapy with aspirin and clopidogrel for 6 months then aspirin for life.    Thank you, Dr. Camargo/Michell Sheffield, APRN-DRE, for this consultation and for allowing me to participate in the care of this patient.      Jd Pugh MD  , Interventional Cardiology Fellowship Program   [facility]   [facility] [employer]  Office Phone Number: [phone]

## 2024-10-10 DIAGNOSIS — Z91.041 ALLERGY TO INTRAVENOUS CONTRAST: Primary | ICD-10-CM

## 2024-10-10 DIAGNOSIS — I48.91 ATRIAL FIBRILLATION, UNSPECIFIED TYPE (MULTI): ICD-10-CM

## 2024-10-10 RX ORDER — PREDNISONE 50 MG/1
50 TABLET ORAL SEE ADMIN INSTRUCTIONS
Qty: 3 TABLET | Refills: 3 | Status: SHIPPED | OUTPATIENT
Start: 2024-10-10

## 2024-10-10 RX ORDER — DIPHENHYDRAMINE HCL 50 MG
50 CAPSULE ORAL SEE ADMIN INSTRUCTIONS
Qty: 3 CAPSULE | Refills: 3 | Status: SHIPPED | OUTPATIENT
Start: 2024-10-10

## 2024-10-14 PROBLEM — Z95.818 PRESENCE OF WATCHMAN LEFT ATRIAL APPENDAGE CLOSURE DEVICE: Status: ACTIVE | Noted: 2024-10-14

## 2024-10-14 RX ORDER — ONDANSETRON HYDROCHLORIDE 2 MG/ML
4 INJECTION, SOLUTION INTRAVENOUS EVERY 8 HOURS PRN
Status: CANCELLED | OUTPATIENT
Start: 2024-10-17

## 2024-10-14 RX ORDER — CLOPIDOGREL BISULFATE 75 MG/1
75 TABLET ORAL DAILY
Status: CANCELLED | OUTPATIENT
Start: 2024-10-17 | End: 2025-10-17

## 2024-10-14 RX ORDER — ACETAMINOPHEN 650 MG/1
650 SUPPOSITORY RECTAL EVERY 6 HOURS PRN
Status: CANCELLED | OUTPATIENT
Start: 2024-10-17

## 2024-10-14 RX ORDER — NAPROXEN SODIUM 220 MG/1
81 TABLET, FILM COATED ORAL DAILY
Status: CANCELLED | OUTPATIENT
Start: 2024-10-17

## 2024-10-14 RX ORDER — ACETAMINOPHEN 325 MG/1
650 TABLET ORAL EVERY 6 HOURS PRN
Status: CANCELLED | OUTPATIENT
Start: 2024-10-17

## 2024-10-14 RX ORDER — SENNOSIDES 8.6 MG/1
2 TABLET ORAL 2 TIMES DAILY
Status: CANCELLED | OUTPATIENT
Start: 2024-10-17

## 2024-10-14 RX ORDER — ACETAMINOPHEN 160 MG/5ML
650 SOLUTION ORAL EVERY 6 HOURS PRN
Status: CANCELLED | OUTPATIENT
Start: 2024-10-17

## 2024-10-14 RX ORDER — ONDANSETRON 4 MG/1
4 TABLET, FILM COATED ORAL EVERY 8 HOURS PRN
Status: CANCELLED | OUTPATIENT
Start: 2024-10-17

## 2024-10-14 RX ORDER — LIDOCAINE HYDROCHLORIDE AND EPINEPHRINE 20; 10 MG/ML; UG/ML
5 INJECTION, SOLUTION INFILTRATION; PERINEURAL ONCE
Status: CANCELLED | OUTPATIENT
Start: 2024-10-17

## 2024-10-14 NOTE — DISCHARGE INSTRUCTIONS
Watchman Discharge Instructions  Anticoagulation Plan:  You are being discharged on Aspirin and Plavix for 6 months (Plavix will be stopped after 6 months and you will remain on 81mg Aspirin for life).  You will have a 4 month CTA to assess the effectiveness of the Watchman Device.     General Instructions:  DO NOT drink any alcoholic drinks or take any non-prescriptive medications that contain alcohol for the first 24 hours.   DO NOT make any important decisions for the first 24 hours.  Activity:  You are advised to go directly home from the hospital.   DO NOT lift anything heavier than 10 pounds for one week, this allows for proper healing of the groin.   No excessive exercise or treadmill use for one week. You may walk and do stairs, slowly.   No sexual activities for 24 hours after you arrive home.    Wound Care:  If slight bleeding should occur at site, lie down and have someone apply firm pressure just above the puncture site for 5 minutes.  If it continues or is profuse, call 911. Always notify your doctor if bleeding occurs.   Keep site clean and dry. Let air dry or you may use a simple bandaid.   Gently cleanse the puncture site in your groin with soap and water only.   You may experience some tenderness, bruising or minimal inflammation.  If you have any concerns, you may contact the Cath Lab or if any of these symptoms become excessive, contact your cardiologist or go to the emergency room.   No tub baths, soaking, or swimming for one week.   May shower the next day after your procedure.    Diet:  You may resume your normal diet. However it is better to start with liquids such as juices then soup and crackers, and gradually work up to solid foods.    Other Instructions:  If you develop difficulty breathing, rash, hives, severe nausea, vomiting, light-headedness or any signs of infection, immediately contact your doctor and go to the nearest emergency room.   You must take your aspirin, clopidogrel  (Plavix), prasugrel (Effient), or ticagrelor (Brilinta) every day without missing a single dose.  If you are getting low on these medications, contact your physician immediately for a refill.  - CALL 911 IF YOU HAVE ANY OF THE SIGNS AND SYMPTOMS OF HEART FAILURE: 1. Chest pain 2. Significant Shortness of breath 3. Fainting.     Call Provider If (Home-going Patients):  Breathing faster than normal.   Breathing harder than normal or having retractions.   Fever of 100.4 F (38 C) or higher.   Chills.   Drinking less than normal.   Urinating less than normal, over 1 day.   Acting very sleepy and difficult to awaken.   Vomiting (throwing up) and not able to eat or drink for 12 hours.   3 or more loose, watery bowel movements in 24 hours (diarrhea).   Any new concerning symptoms.    Please call the STRUCTURAL HEART TEAM LINE if you have any questions or concerns - [phone]

## 2024-10-16 NOTE — PRE-SEDATION DOCUMENTATION
Patient: Twyla Waller  MRN: [id number]  NPO guidelines met: Yes    Physical Exam    Airway  Mallampati: III  Neck ROM: full     Cardiovascular   Rhythm: regular  Rate: normal     Dental    Pulmonary        Plan    ASA 3     Mild

## 2024-10-16 NOTE — H&P
History Of Present Illness   This is a 74 y.o. female with PMH of syncope and high-grade AVB with sinus node dysfunction, s/p PPM, paroxysmal Afib  with recurrence s/p cardioversion, currently on Eliquis and Amiodarone, orthostatic hypotension. Given the patient's significant fall risk,  the patient is referred for consideration of left atrial appendage closure for stroke risk reduction.     The CHADS-VASC score is 6 and HAS-BLED score is 4.      Past Medical History  Past Medical History:   Diagnosis Date    Arrhythmia     A-fib    Diabetes mellitus (Multi)     Hyperlipidemia     Hypertension        Surgical History  Past Surgical History:   Procedure Laterality Date    BOWEL RESECTION       SECTION, CLASSIC      ESOPHAGUS SURGERY      HYSTERECTOMY      PACEMAKER PLACEMENT      SHOULDER Left     TONSILECTOMY, ADENOIDECTOMY, BILATERAL MYRINGOTOMY AND TUBES          Social History  She reports that she has never smoked. She has never used smokeless tobacco. She reports that she does not currently use alcohol. She reports that she does not currently use drugs.    Family History  Family History   Problem Relation Name Age of Onset    Colon cancer Mother      Hypertension Father      Aneurysm Father      Pancreatic cancer Sister          Allergies  Morphine, Verapamil, Calcium channel blocking agents-dihydropyridines, Latex, Penicillins, Ace inhibitors, Alendronate, Calcium channel blocking agent diltiazem analogues, Ibuprofen, Lisinopril, Metformin, Rybelsus [semaglutide], Sertraline, and Iodine    Review of Systems   .ROS:  Constitutional: no fatigue, no fevers, no body aches  Eyes: no acute eye problems, no blurred vision, no diplopia, no eye pain  ENT:  no acute hearing loss, no earache, no sore throat  Cardiovascular: dyspnea on exertion, no chest pain  Respiratory: no chronic cough, not coughing up sputum, no wheezing that is consistent with asthma  Gastrointestinal: no acute bowel  complaints  Musculoskeletal: no acute arthralgias, no acute myalgias, no acute joint swelling  Skin: no skin rashes, no change in skin color and pigmentation, no skin lesions and no skin lumps.   Neurological: no headaches, no dizziness, no tingling, no fainting and no limb weakness.   Psychiatric:  no suicidal ideation, no confusion, no personality change and no emotional problems.   Hematologic/Lymphatic: no bleeding issues, other then mentioned in HPI  All other systems have been reviewed and are negative for complaint.   Physical Exam   Constitutional: alert and in no acute distress.   Eyes: no erythema, swelling or discharge from the eye .   Ears, Nose, Mouth, and Throat: external inspection of ears and nose is normal , lips, teeth, and gums are normal with good dentition  and oropharynx normal with no erythema, edema, exudate or lesions .   Neck: neck is supple, symmetric, trachea midline, no masses  and no thyromegaly .   Pulmonary: no increased work of breathing or signs of respiratory distress , lungs clear to auscultation. , normal percussion of chest  and chest palpation normal .   Cardiovascular: RRR, no murmur,  no leg edema, non-displaced PMI, no S3 or S4  Abdomen: abdomen non-tender, no masses  and no hepatomegaly .           Skin:  no skin lesions          Neurologic: non-focal neurologic examination.      Psychiatric judgment and insight is normal , oriented to person, place and time , normal mood and affect .     Last Labs:  CBC - 10/9/2024:  3:03 PM  8.5 12.1 291    38.2      BMP  140  100  22                  ----------------<93     4.5  34  1.12     CMP - 10/9/2024:  3:03 PM  9.1 6.2 22 --- 0.5   3.3 3.5 23 71      PTT - 6/6/2024:  8:15 AM  1.7   19.5 38     Troponin I, High Sensitivity   Date/Time Value Ref Range Status   06/24/2024 02:23 PM 8 0 - 13 ng/L Final        Assessment/Plan   Assessment & Plan  Atrial fibrillation (Multi)    Presence of Watchman left atrial appendage closure  device    Atrial tachycardia (CMS-HCC)      LAAO procedure today       I spent 30 minutes in the professional and overall care of this patient.      Godfrey LEMUS, [facility]  Structural Heart Program  Advanced Interventional Cardiology  Pager [phone]  Team pager [phone]

## 2024-10-17 ENCOUNTER — HOSPITAL ENCOUNTER (INPATIENT)
Facility: HOSPITAL | Age: 74
LOS: 1 days | Discharge: HOME | DRG: 274 | End: 2024-10-17
Attending: INTERNAL MEDICINE | Admitting: INTERNAL MEDICINE
Payer: MEDICARE

## 2024-10-17 ENCOUNTER — HOSPITAL ENCOUNTER (OUTPATIENT)
Dept: RADIOLOGY | Facility: HOSPITAL | Age: 74
Discharge: HOME | End: 2024-10-17
Payer: MEDICARE

## 2024-10-17 ENCOUNTER — APPOINTMENT (OUTPATIENT)
Dept: CARDIOLOGY | Facility: HOSPITAL | Age: 74
DRG: 274 | End: 2024-10-17
Payer: MEDICARE

## 2024-10-17 VITALS
OXYGEN SATURATION: 97 % | RESPIRATION RATE: 18 BRPM | HEART RATE: 68 BPM | SYSTOLIC BLOOD PRESSURE: 173 MMHG | DIASTOLIC BLOOD PRESSURE: 97 MMHG

## 2024-10-17 DIAGNOSIS — I48.91 ATRIAL FIBRILLATION, UNSPECIFIED TYPE (MULTI): ICD-10-CM

## 2024-10-17 DIAGNOSIS — I47.19 ATRIAL TACHYCARDIA (CMS-HCC): Primary | ICD-10-CM

## 2024-10-17 DIAGNOSIS — I48.20 CHRONIC ATRIAL FIBRILLATION, UNSPECIFIED (MULTI): ICD-10-CM

## 2024-10-17 DIAGNOSIS — Z01.810 PREOP CARDIOVASCULAR EXAM: ICD-10-CM

## 2024-10-17 DIAGNOSIS — Z95.818 PRESENCE OF WATCHMAN LEFT ATRIAL APPENDAGE CLOSURE DEVICE: ICD-10-CM

## 2024-10-17 LAB — EJECTION FRACTION: 53 %

## 2024-10-17 PROCEDURE — 2550000001 HC RX 255 CONTRASTS: Performed by: INTERNAL MEDICINE

## 2024-10-17 PROCEDURE — 75572 CT HRT W/3D IMAGE: CPT

## 2024-10-17 PROCEDURE — 99152 MOD SED SAME PHYS/QHP 5/>YRS: CPT | Performed by: INTERNAL MEDICINE

## 2024-10-17 PROCEDURE — 2500000001 HC RX 250 WO HCPCS SELF ADMINISTERED DRUGS (ALT 637 FOR MEDICARE OP): Performed by: NURSE PRACTITIONER

## 2024-10-17 PROCEDURE — 2720000007 HC OR 272 NO HCPCS: Performed by: INTERNAL MEDICINE

## 2024-10-17 PROCEDURE — 2780000003 HC OR 278 NO HCPCS: Performed by: INTERNAL MEDICINE

## 2024-10-17 PROCEDURE — 85347 COAGULATION TIME ACTIVATED: CPT | Performed by: INTERNAL MEDICINE

## 2024-10-17 PROCEDURE — 76937 US GUIDE VASCULAR ACCESS: CPT | Performed by: INTERNAL MEDICINE

## 2024-10-17 PROCEDURE — 93308 TTE F-UP OR LMTD: CPT | Performed by: INTERNAL MEDICINE

## 2024-10-17 PROCEDURE — 93308 TTE F-UP OR LMTD: CPT

## 2024-10-17 PROCEDURE — C1893 INTRO/SHEATH, FIXED,NON-PEEL: HCPCS | Performed by: INTERNAL MEDICINE

## 2024-10-17 PROCEDURE — 2500000004 HC RX 250 GENERAL PHARMACY W/ HCPCS (ALT 636 FOR OP/ED): Performed by: NURSE PRACTITIONER

## 2024-10-17 PROCEDURE — 93662 INTRACARDIAC ECG (ICE): CPT | Performed by: INTERNAL MEDICINE

## 2024-10-17 PROCEDURE — 33340 PERQ CLSR TCAT L ATR APNDGE: CPT | Performed by: INTERNAL MEDICINE

## 2024-10-17 PROCEDURE — 7100000009 HC PHASE TWO TIME - INITIAL BASE CHARGE: Performed by: INTERNAL MEDICINE

## 2024-10-17 PROCEDURE — C1760 CLOSURE DEV, VASC: HCPCS | Performed by: INTERNAL MEDICINE

## 2024-10-17 PROCEDURE — 99153 MOD SED SAME PHYS/QHP EA: CPT | Performed by: INTERNAL MEDICINE

## 2024-10-17 PROCEDURE — G0269 OCCLUSIVE DEVICE IN VEIN ART: HCPCS | Performed by: INTERNAL MEDICINE

## 2024-10-17 PROCEDURE — C1759 CATH, INTRA ECHOCARDIOGRAPHY: HCPCS | Performed by: INTERNAL MEDICINE

## 2024-10-17 PROCEDURE — 7100000010 HC PHASE TWO TIME - EACH INCREMENTAL 1 MINUTE: Performed by: INTERNAL MEDICINE

## 2024-10-17 PROCEDURE — C1889 IMPLANT/INSERT DEVICE, NOC: HCPCS | Performed by: INTERNAL MEDICINE

## 2024-10-17 PROCEDURE — C1894 INTRO/SHEATH, NON-LASER: HCPCS | Performed by: INTERNAL MEDICINE

## 2024-10-17 PROCEDURE — XXE2X19 MEASUREMENT OF CARDIAC OUTPUT, COMPUTER-AIDED ASSESSMENT, NEW TECHNOLOGY GROUP 9: ICD-10-PCS | Performed by: INTERNAL MEDICINE

## 2024-10-17 PROCEDURE — 1210000001 HC SEMI-PRIVATE ROOM DAILY

## 2024-10-17 PROCEDURE — 2500000004 HC RX 250 GENERAL PHARMACY W/ HCPCS (ALT 636 FOR OP/ED): Performed by: INTERNAL MEDICINE

## 2024-10-17 PROCEDURE — 02L73DK OCCLUSION OF LEFT ATRIAL APPENDAGE WITH INTRALUMINAL DEVICE, PERCUTANEOUS APPROACH: ICD-10-PCS | Performed by: INTERNAL MEDICINE

## 2024-10-17 PROCEDURE — 2500000001 HC RX 250 WO HCPCS SELF ADMINISTERED DRUGS (ALT 637 FOR MEDICARE OP): Performed by: INTERNAL MEDICINE

## 2024-10-17 DEVICE — LEFT ATRIAL APPENDAGE CLOSURE DEVICE WITH DELIVERY SYSTEM
Type: IMPLANTABLE DEVICE | Site: HEART | Status: FUNCTIONAL
Brand: WATCHMAN FLX™ PRO

## 2024-10-17 RX ORDER — LIDOCAINE HYDROCHLORIDE 20 MG/ML
INJECTION, SOLUTION INFILTRATION; PERINEURAL AS NEEDED
Status: DISCONTINUED | OUTPATIENT
Start: 2024-10-17 | End: 2024-10-17 | Stop reason: HOSPADM

## 2024-10-17 RX ORDER — NAPROXEN SODIUM 220 MG/1
324 TABLET, FILM COATED ORAL ONCE
Status: COMPLETED | OUTPATIENT
Start: 2024-10-17 | End: 2024-10-17

## 2024-10-17 RX ORDER — MIDAZOLAM HYDROCHLORIDE 1 MG/ML
INJECTION, SOLUTION INTRAMUSCULAR; INTRAVENOUS AS NEEDED
Status: DISCONTINUED | OUTPATIENT
Start: 2024-10-17 | End: 2024-10-17 | Stop reason: HOSPADM

## 2024-10-17 RX ORDER — HEPARIN SODIUM 1000 [USP'U]/ML
INJECTION, SOLUTION INTRAVENOUS; SUBCUTANEOUS AS NEEDED
Status: DISCONTINUED | OUTPATIENT
Start: 2024-10-17 | End: 2024-10-17 | Stop reason: HOSPADM

## 2024-10-17 RX ORDER — NAPROXEN SODIUM 220 MG/1
81 TABLET, FILM COATED ORAL DAILY
Qty: 30 TABLET | Refills: 11 | Status: SHIPPED | OUTPATIENT
Start: 2024-10-17 | End: 2025-10-17

## 2024-10-17 RX ORDER — FENTANYL CITRATE 50 UG/ML
INJECTION, SOLUTION INTRAMUSCULAR; INTRAVENOUS AS NEEDED
Status: DISCONTINUED | OUTPATIENT
Start: 2024-10-17 | End: 2024-10-17 | Stop reason: HOSPADM

## 2024-10-17 RX ORDER — PROTAMINE SULFATE 10 MG/ML
INJECTION, SOLUTION INTRAVENOUS CONTINUOUS PRN
Status: COMPLETED | OUTPATIENT
Start: 2024-10-17 | End: 2024-10-17

## 2024-10-17 RX ORDER — VANCOMYCIN HYDROCHLORIDE 1 G/200ML
1000 INJECTION, SOLUTION INTRAVENOUS ONCE
Status: COMPLETED | OUTPATIENT
Start: 2024-10-17 | End: 2024-10-17

## 2024-10-17 RX ORDER — CLOPIDOGREL BISULFATE 300 MG/1
TABLET, FILM COATED ORAL AS NEEDED
Status: DISCONTINUED | OUTPATIENT
Start: 2024-10-17 | End: 2024-10-17 | Stop reason: HOSPADM

## 2024-10-17 RX ORDER — CLOPIDOGREL BISULFATE 75 MG/1
75 TABLET ORAL DAILY
Qty: 30 TABLET | Refills: 5 | Status: SHIPPED | OUTPATIENT
Start: 2024-10-17 | End: 2025-04-15

## 2024-10-17 ASSESSMENT — COLUMBIA-SUICIDE SEVERITY RATING SCALE - C-SSRS
6. HAVE YOU EVER DONE ANYTHING, STARTED TO DO ANYTHING, OR PREPARED TO DO ANYTHING TO END YOUR LIFE?: NO
1. IN THE PAST MONTH, HAVE YOU WISHED YOU WERE DEAD OR WISHED YOU COULD GO TO SLEEP AND NOT WAKE UP?: NO
2. HAVE YOU ACTUALLY HAD ANY THOUGHTS OF KILLING YOURSELF?: NO

## 2024-10-17 NOTE — PROGRESS NOTES
Pharmacy Medication History Review    Twyla Waller is a 74 y.o. female admitted for Atrial fibrillation (Multi). Pharmacy reviewed the patient's [id number] medications and allergies for accuracy.    Medications ADDED:  none  Medications CHANGED:  Furosemide changed ----> to 40mg daily  Medications REMOVED:   Albuterol inhaler  Cipro (7 day course from 9/18)  Prolia (discontinued per NOMS summary, last dose 6 months ago)  Nexium (discontinued)  Gabapentin (Pt removal, no fill/OARRS hx)  Lidocaine 5% patches      The list below reflects the updated PTA list.   Prior to Admission Medications   Prescriptions Last Dose Informant   Lantus Solostar U-100 Insulin 100 unit/mL (3 mL) pen 10/17/2024 Self, Spouse/Significant Other   Sig: Inject 22 Units under the skin once daily in the morning.   Rybelsus 14 mg tablet tablet 10/17/2024 Self, Spouse/Significant Other   Sig: Take 1 tablet (14 mg) by mouth once daily. 30 MINUTES BEFORE FIRST FOOD, BEVERAGE, OR OTHER MEDICATION OF THE DAY   acetaminophen (Tylenol) 500 mg tablet 10/15/2024 Self, Spouse/Significant Other   Sig: Take 2 tablets (1,000 mg) by mouth once daily at bedtime.   amLODIPine (Norvasc) 2.5 mg tablet 10/16/2024 Self, Spouse/Significant Other   Sig: Take 1 tablet (2.5 mg) by mouth once daily.   amiodarone (Pacerone) 200 mg tablet 10/17/2024 Self, Spouse/Significant Other   Sig: Take 1 tablet (200 mg) by mouth once daily.   apixaban (Eliquis) 5 mg tablet 10/15/2024 Self, Spouse/Significant Other   Sig: Take 1 tablet (5 mg) by mouth 2 times a day.   atorvastatin (Lipitor) 20 mg tablet 10/16/2024 Self, Spouse/Significant Other   Sig: Take 1 tablet (20 mg) by mouth once daily at bedtime.   cholecalciferol (Vitamin D-3) 50 mcg (2,000 unit) capsule 10/16/2024 Self, Spouse/Significant Other   Sig: Take 1 capsule (50 mcg) by mouth once daily.   citalopram (CeleXA) 40 mg tablet 10/17/2024 Self, Spouse/Significant Other   Sig: Take 1 tablet (40 mg) by mouth once  daily in the morning.   cyanocobalamin (Vitamin B-12) 1,000 mcg tablet 10/16/2024 Self, Spouse/Significant Other   Sig: Take 1 tablet (1,000 mcg) by mouth once daily.   diphenhydrAMINE (BENADryl) 50 mg capsule Not Taking Self, Spouse/Significant Other   Sig: Take 1 capsule (50 mg) by mouth see administration instructions. TAKE THIS MEDICATION ONE BEFORE PROCEDURE  that requires IV contrast.   Patient not taking: Reported on 10/17/2024   furosemide (Lasix) 20 mg tablet  Self, Spouse/Significant Other   Sig: Take 2 tablets (40 mg) by mouth once daily.   insulin lispro (HumaLOG) 100 unit/mL injection  Self, Spouse/Significant Other   Sig: Inject 8 Units under the skin 3 times a day before meals. Plus Sliding Scale   metoprolol tartrate (Lopressor) 25 mg tablet 10/17/2024 Self, Spouse/Significant Other   Sig: Take 0.5 tablets (12.5 mg) by mouth 2 times a day.   Patient taking differently: Take 1 tablet (25 mg) by mouth 2 times a day.   mirtazapine (Remeron) 15 mg tablet 10/16/2024 Self, Spouse/Significant Other   Sig: Take 1 tablet (15 mg) by mouth once daily at bedtime.   potassium chloride CR 10 mEq ER tablet 10/16/2024 Self, Spouse/Significant Other   Sig: Take 1 tablet (10 mEq) by mouth 2 times a day.   predniSONE (Deltasone) 50 mg tablet 10/17/2024 Self, Spouse/Significant Other   Sig: Take 1 tablet (50 mg) by mouth see administration instructions. Please take this medication 13 hrs before procedure. Followed by 7 hrs before and 1 hr before   risperiDONE (RisperDAL) 0.5 mg tablet 10/16/2024 Self, Spouse/Significant Other   Sig: Take 1 tablet (0.5 mg) by mouth once daily at bedtime.   solifenacin (VESIcare) 10 mg tablet 10/17/2024 Self, Spouse/Significant Other   Sig: Take 1 tablet (10 mg) by mouth early in the morning..      Facility-Administered Medications: None        The list below reflects the updated allergy list. Please review each documented allergy for additional clarification and justification.  Allergies  " Reviewed by Yunior Rivera, CamachoD on 10/17/2024        Severity Reactions Comments    Morphine High Other Mental Status Change    Verapamil High Unknown     Calcium Channel Blocking Agents-dihydropyridines Medium Other irregular heart rate    Latex Medium Itching, Rash     Penicillins Medium Rash     Ace Inhibitors Not Specified Cough     Alendronate Not Specified Unknown     Calcium Channel Blocking Agent Diltiazem Analogues Not Specified Itching     Ibuprofen Not Specified Unknown     Iodinated Contrast Media Not Specified Unknown Per patient. Chart only had iodine listed, not iodinated contrast    Lisinopril Not Specified Cough     Metformin Not Specified Diarrhea     Sertraline Not Specified Unknown     Trulicity [dulaglutide] Not Specified Diarrhea     Iodine Low Rash             Patient declines M2B at discharge.     Sources:   Good historian. Patient interviewed at bedside, spouse had completed medication history  NOMS Dispense Summary     Additional Comments:  Prednisone therapy completed  Allergy clarified, previous allergy listed Rybelsus (pt currently on); reaction: diarrhea. Confirmed with patient it was Trulicity; updated       Yunior Rivera PharmD  Transitions of Care Pharmacist  10/17/24     Secure Chat preferred   If no response call [phone] or Vocera \"Med Rec\"    "

## 2024-10-17 NOTE — Clinical Note
Advanced care planning was discussed with family.  Advanced care planning forms were reviewed and discussed as appropriate.  Differential diagnosis and plan of care discussed with family after the evaluation.   Pain assessed and judicious use of narcotics when appropriate was discussed.  Importance of Fall prevention discussed.  Counseling on Smoking and Alcohol cessation was offered when appropriate.  Counseling on Diet, exercise, and medication compliance was done. The right DP pulse is 1+. Advanced care planning was discussed with family.  Advanced care planning forms were reviewed and discussed as appropriate.  Differential diagnosis and plan of care discussed with family after the evaluation.   Pain assessed and judicious use of narcotics when appropriate was discussed.  Importance of Fall prevention discussed.  Counseling on Smoking and Alcohol cessation was offered when appropriate.  Counseling on Diet, exercise, and medication compliance was done.

## 2024-10-17 NOTE — DISCHARGE SUMMARY
strDischarge Diagnosis  Atrial fibrillation (Multi)    Issues Requiring Follow-Up  DAPT    Test Results Pending At Discharge  Pending Labs       No current pending labs.            Hospital Course   LAAO D/C summary statement:    The patient underwent successful Left atrial appendage closure with a 27 mm WATCHMAN FLX under ICE guidance.  pre and post procedural TTE was obtained and demonstrated no significant changes.  The patient tolerated the post procedural monitoring period without any bleeding, arrhythmia or hypotension.      S/p TEX closure on 10/17/2024    Access: Dual right femoral vein access s/p 1 perclose and 1 vascade closure devices.     Device: After confirmation of the device position on fluoroscopy and ICE, anchor with a tug test, compression and seal a 27 mm Watchman was successfully deployed without any immediate complications.     No effusion on ICE was present.     Post procedural TTE: No significant pericardial effusion.     The patient was discharge home in satisfactory condition.     Patient denies allergy for aspirin.    Pertinent Physical Exam At Time of Discharge  Physical Exam  AO x 3  CVS- normal s1, s2, no murmurs  Resp -CTAB  Abd- Soft, NT, ND  Neuro  No gross motor. Sensory deficits   Groin access sites no hematoma, swelling   No LE edema      Home Medications     Medication List      ASK your doctor about these medications     acetaminophen 500 mg tablet; Commonly known as: Tylenol   amiodarone 200 mg tablet; Commonly known as: Pacerone; Take 1 tablet   (200 mg) by mouth once daily.   amLODIPine 2.5 mg tablet; Commonly known as: Norvasc   apixaban 5 mg tablet; Commonly known as: Eliquis; Take 1 tablet (5 mg)   by mouth 2 times a day.   atorvastatin 20 mg tablet; Commonly known as: Lipitor   cholecalciferol 50 mcg (2,000 unit) capsule; Commonly known as: Vitamin   D-3   citalopram 40 mg tablet; Commonly known as: CeleXA   cyanocobalamin 1,000 mcg tablet; Commonly known as: Vitamin  B-12   diphenhydrAMINE 50 mg capsule; Commonly known as: BENADryl; Take 1   capsule (50 mg) by mouth see administration instructions. TAKE THIS   MEDICATION ONE BEFORE PROCEDURE  that requires IV contrast.   furosemide 20 mg tablet; Commonly known as: Lasix   insulin lispro 100 unit/mL injection; Commonly known as: HumaLOG   Lantus Solostar U-100 Insulin 100 unit/mL (3 mL) pen; Generic drug:   insulin glargine   metoprolol tartrate 25 mg tablet; Commonly known as: Lopressor; Take 0.5   tablets (12.5 mg) by mouth 2 times a day.   mirtazapine 15 mg tablet; Commonly known as: Remeron   potassium chloride CR 10 mEq ER tablet; Commonly known as: Klor-Con   predniSONE 50 mg tablet; Commonly known as: Deltasone; Take 1 tablet (50   mg) by mouth see administration instructions. Please take this medication   13 hrs before procedure. Followed by 7 hrs before and 1 hr before   risperiDONE 0.5 mg tablet; Commonly known as: RisperDAL   Rybelsus 14 mg tablet tablet; Generic drug: semaglutide   solifenacin 10 mg tablet; Commonly known as: VESIcare       Outpatient Follow-Up  Future Appointments   Date Time Provider Department Center   10/31/2024 10:00 AM ROBBI MonterrosoCNP [id number] [place]   2/18/2025 11:00 AM ELY [id number] CT 1 [id number] [facility]   3/17/2025  2:00 PM ROBBI GamezCNP [id number] [place]   9/5/2025 12:00 PM [facility] [id number] [place]   9/5/2025 12:40 PM Ofelia Camargo MD [id number] [place]       Scott Sequeira MD

## 2024-10-18 LAB — EJECTION FRACTION: 53 %

## 2024-10-31 ENCOUNTER — APPOINTMENT (OUTPATIENT)
Dept: CARDIOLOGY | Facility: CLINIC | Age: 74
End: 2024-10-31
Payer: MEDICARE

## 2024-12-09 ENCOUNTER — HOSPITAL ENCOUNTER (OUTPATIENT)
Dept: CARDIOLOGY | Facility: HOSPITAL | Age: 74
Discharge: HOME | End: 2024-12-09
Payer: MEDICARE

## 2024-12-09 DIAGNOSIS — Z95.0 PACEMAKER: ICD-10-CM

## 2024-12-09 DIAGNOSIS — Z95.0 CARDIAC PACEMAKER IN SITU: Primary | ICD-10-CM

## 2024-12-09 DIAGNOSIS — I49.5 SINUS NODE DYSFUNCTION (MULTI): ICD-10-CM

## 2024-12-09 DIAGNOSIS — I44.30 AV BLOCK: ICD-10-CM

## 2024-12-09 DIAGNOSIS — I44.2 ATRIOVENTRICULAR BLOCK, COMPLETE (MULTI): ICD-10-CM

## 2024-12-09 PROCEDURE — 93294 REM INTERROG EVL PM/LDLS PM: CPT | Performed by: INTERNAL MEDICINE

## 2024-12-09 PROCEDURE — 93296 REM INTERROG EVL PM/IDS: CPT

## 2025-02-05 ENCOUNTER — TELEPHONE (OUTPATIENT)
Dept: CARDIOLOGY | Facility: HOSPITAL | Age: 75
End: 2025-02-05
Payer: MEDICARE

## 2025-02-05 NOTE — TELEPHONE ENCOUNTER
Pre-meds requested for upcoming post Watchman CT scan.   [FreeTextEntry1] : HTN [de-identified] : 61 yo Polio age 17, possible CAD. Per pt advised by Dr Constantine Brooks yrs ago no coronary angiogram needed. Saw me Oct 22, 2021 and advised he was off all medications since February 2019. Prescribed statin, ACEIU and HCTZ.\par Return visit November 23 and was still on no medications at all. /114.\par \par HAs reported episodic HA for which sent for aneurysm rule out CT angio Head\par Had SHINGRIX  \par NEEDS BOOSTER  HAD JJ     JANUARY 2022\par \par Advised bring all meds\par \par BP HIGH was perturbed about scheduling issue\par NO longer having palpitations\par States insurance declined CTA for HA\par \par HA L parietal RODRIGES 10/10 lasting minutes feels hot and cold then gone after 4 minutes. Once onthly\par No FH of aneurysm\par Had MRA 2014 for R hand numbness and [place] NORMAL\par \par \par \par Reported Palpitations for which will see Dr Ryan this afternoon\par HA 10/10    had central pain\par HA before felodipine  bi parietal   pressure warm and cold no longer\par \par Sat back of head 10/10 pressure  HAS NOT RECURRED  ice and tylenol helped No slurred speech\par This was March 12 9PM no ETOH  no N/V  ++ photophobia  BILATERAL occipital HA\par LASTED 7 hours\par \par CTA  NEGATIVE Jan 27 2022

## 2025-02-06 DIAGNOSIS — Z91.041 ALLERGY TO INTRAVENOUS CONTRAST: ICD-10-CM

## 2025-02-06 RX ORDER — DIPHENHYDRAMINE HCL 50 MG
50 CAPSULE ORAL SEE ADMIN INSTRUCTIONS
Qty: 3 CAPSULE | Refills: 3 | Status: SHIPPED | OUTPATIENT
Start: 2025-02-06

## 2025-02-06 RX ORDER — PREDNISONE 50 MG/1
50 TABLET ORAL SEE ADMIN INSTRUCTIONS
Qty: 3 TABLET | Refills: 3 | Status: SHIPPED | OUTPATIENT
Start: 2025-02-06

## 2025-02-18 ENCOUNTER — APPOINTMENT (OUTPATIENT)
Dept: RADIOLOGY | Facility: CLINIC | Age: 75
End: 2025-02-18
Payer: MEDICARE

## 2025-03-17 ENCOUNTER — APPOINTMENT (OUTPATIENT)
Dept: CARDIOLOGY | Facility: CLINIC | Age: 75
End: 2025-03-17
Payer: MEDICARE

## 2025-03-17 VITALS
HEART RATE: 64 BPM | SYSTOLIC BLOOD PRESSURE: 128 MMHG | BODY MASS INDEX: 25.92 KG/M2 | DIASTOLIC BLOOD PRESSURE: 76 MMHG | HEIGHT: 64 IN

## 2025-03-17 DIAGNOSIS — I10 BENIGN ESSENTIAL HYPERTENSION: ICD-10-CM

## 2025-03-17 DIAGNOSIS — Z79.899 HIGH RISK MEDICATION USE: Primary | ICD-10-CM

## 2025-03-17 DIAGNOSIS — Z95.0 PACEMAKER: ICD-10-CM

## 2025-03-17 DIAGNOSIS — Z95.818 PRESENCE OF WATCHMAN LEFT ATRIAL APPENDAGE CLOSURE DEVICE: ICD-10-CM

## 2025-03-17 DIAGNOSIS — I48.0 PAROXYSMAL ATRIAL FIBRILLATION (MULTI): ICD-10-CM

## 2025-03-17 DIAGNOSIS — E78.2 MIXED HYPERLIPIDEMIA: ICD-10-CM

## 2025-03-17 PROBLEM — F22: Status: RESOLVED | Noted: 2023-06-14 | Resolved: 2025-03-17

## 2025-03-17 PROBLEM — R07.9 CHEST PAIN: Status: RESOLVED | Noted: 2023-11-10 | Resolved: 2025-03-17

## 2025-03-17 PROBLEM — E13.319: Status: RESOLVED | Noted: 2023-06-14 | Resolved: 2025-03-17

## 2025-03-17 PROCEDURE — 99214 OFFICE O/P EST MOD 30 MIN: CPT | Performed by: NURSE PRACTITIONER

## 2025-03-17 PROCEDURE — 3078F DIAST BP <80 MM HG: CPT | Performed by: NURSE PRACTITIONER

## 2025-03-17 PROCEDURE — 1160F RVW MEDS BY RX/DR IN RCRD: CPT | Performed by: NURSE PRACTITIONER

## 2025-03-17 PROCEDURE — 3074F SYST BP LT 130 MM HG: CPT | Performed by: NURSE PRACTITIONER

## 2025-03-17 PROCEDURE — 93000 ELECTROCARDIOGRAM COMPLETE: CPT | Performed by: NURSE PRACTITIONER

## 2025-03-17 PROCEDURE — 1159F MED LIST DOCD IN RCRD: CPT | Performed by: NURSE PRACTITIONER

## 2025-03-17 RX ORDER — OXYBUTYNIN CHLORIDE 5 MG/1
5 TABLET ORAL 2 TIMES DAILY
COMMUNITY

## 2025-03-17 RX ORDER — FOLIC ACID 0.4 MG
TABLET ORAL DAILY
COMMUNITY

## 2025-03-17 RX ORDER — ASCORBIC ACID 500 MG
500 TABLET ORAL DAILY
COMMUNITY

## 2025-03-17 RX ORDER — SENNOSIDES 8.6 MG/1
1 TABLET ORAL DAILY
COMMUNITY

## 2025-03-17 RX ORDER — GLUCOSAMINE/CHONDRO SU A 500-400 MG
1 TABLET ORAL DAILY
COMMUNITY

## 2025-03-17 RX ORDER — ONDANSETRON 4 MG/1
4 TABLET, FILM COATED ORAL EVERY 8 HOURS PRN
COMMUNITY

## 2025-03-17 RX ORDER — POLYETHYLENE GLYCOL 3350 17 G/17G
17 POWDER, FOR SOLUTION ORAL DAILY
COMMUNITY

## 2025-03-17 NOTE — PROGRESS NOTES
"Chief Complaint  \" We are here for checkup\"    Reason for Visit  6-month follow-up  Patient presents to the office today for outpatient follow-up for A-fib, high risk med use, Watchman device, primary prevention.  Last evaluated in clinic by Dr. Camargo December 2024.  This is initial general cardiology follow-up.    Presents today in wheelchair for ease of transport.  Accompanied by spouse    Report a hospitalization for fall and flulike symptoms; no cardiology consultation.  She was discharged to a local SNF to complete comprehensive rehabilitation program approximately 10 days ago.    History of Present Illness   Very pleasant 75-year-old female with mild neurocognitive decline; unreliable historian.  She presents today in a wheelchair but is reportedly undergoing physical therapy at SNF.   reports that preceding hospitalization she just had progressive weakness at home with fairly frequent falls due to GI illness.  She complains of what sounds like orthostatic hypotension during physical therapy but no recurrent syncopal episodes.  Apparently amlodipine is a new medication.  Blood pressure in the office today is optimal.  There is no orthopnea or PND.  Daily activity less than 4 METS.    Daily activity:    < 4 METs  Denies any change in exercise capacity or functional tolerance since last office visit.    Overall,  appears to be stable from a cardiovascular standpoint. No anginal symptoms or left sided heart failure.   reports 'always on water pill\" (MR mild/moderate, LE edema) -recent labs without evidence of azotemia.    Review of Systems   Unable to perform ROS: Dementia   Cardiovascular:  Negative for chest pain, dyspnea on exertion, irregular heartbeat, leg swelling, near-syncope, orthopnea, palpitations, paroxysmal nocturnal dyspnea and syncope.        Visit Vitals  /76 (BP Location: Right arm, Patient Position: Sitting)   Pulse 64   Ht 1.626 m (5' 4\")   BMI 25.92 kg/m²   Smoking Status " Never   BSA 1.76 m²     Physical Exam  Vitals and nursing note reviewed.   HENT:      Head: Normocephalic.   Cardiovascular:      Rate and Rhythm: Normal rate and regular rhythm.      Heart sounds: Murmur heard.      Systolic murmur is present with a grade of 1/6.      Comments: Trace ankle edema  Pulmonary:      Effort: Pulmonary effort is normal.      Breath sounds: Normal breath sounds.   Abdominal:      Palpations: Abdomen is soft.   Musculoskeletal:      Right lower leg: No edema.      Left lower leg: No edema.   Skin:     General: Skin is warm and dry.   Neurological:      General: No focal deficit present.      Mental Status: She is alert.   Psychiatric:         Mood and Affect: Mood normal.         Behavior: Behavior normal.        Allergies   Allergen Reactions    Morphine Other     Mental Status Change    Verapamil Unknown    Calcium Channel Blocking Agents-Dihydropyridines Other     irregular heart rate    Latex Itching and Rash    Penicillins Rash    Ace Inhibitors Cough    Alendronate Unknown    Calcium Channel Blocking Agent Diltiazem Analogues Itching    Ibuprofen Unknown    Iodinated Contrast Media Unknown     Per patient. Chart only had iodine listed, not iodinated contrast    Lisinopril Cough    Metformin Diarrhea    Sertraline Unknown    Trulicity [Dulaglutide] Diarrhea    Iodine Rash     Current Outpatient Medications   Medication Instructions    acetaminophen (TYLENOL) 1,000 mg, Nightly    amiodarone (PACERONE) 200 mg, oral, Daily    amLODIPine (NORVASC) 5 mg, Daily    ascorbic acid (VITAMIN C) 500 mg, Daily    aspirin 81 mg, oral, Daily    atorvastatin (LIPITOR) 20 mg, Nightly    cholecalciferol (VITAMIN D-3) 2,000 Units, Daily RT    citalopram (CELEXA) 40 mg, Every morning    clopidogrel (PLAVIX) 75 mg, oral, Daily    cyanocobalamin (Vitamin B-12) 1,000 mcg tablet 1 tablet, Daily    folic acid (Folvite) 400 mcg tablet Daily    furosemide (LASIX) 40 mg, Daily    glucosamine-chondroitin 500-400  mg tablet 1 tablet, Daily    insulin lispro (HUMALOG) 8 Units, 3 times daily before meals    Lantus Solostar U-100 Insulin 22 Units, Every morning    metoprolol tartrate (LOPRESSOR) 12.5 mg, oral, 2 times daily    mirtazapine (REMERON) 15 mg, Nightly    ondansetron (ZOFRAN) 4 mg, Every 8 hours PRN    oxybutynin (DITROPAN) 5 mg, 2 times daily    polyethylene glycol (GLYCOLAX, MIRALAX) 17 g, Daily    potassium chloride CR 10 mEq ER tablet 10 mEq, 2 times daily    risperiDONE (RISPERDAL) 0.5 mg, Nightly    Rybelsus 14 mg, Daily    sennosides (Senokot) 8.6 mg tablet 1 tablet, Daily      Assessment:    High risk medication use  Amiodarone start date July 2024  EKG in office sinus rhythm first-degree AV block, QTc 493.  Will make arrangements for amiodarone surveillance testing  March 2025 AST 71, ALT 74 (2 weeks prior 24 & 27)    Benign essential hypertension  optimal in office      Hyperlipidemia  Moderate intensity statin    Presence of Watchman left atrial appendage closure device  October 17, 2024:  #24mm Watchman LAAO device  CHADS VASc 4  Frequent falls  Remains on DAPT    Atrial fibrillation (Multi)  Identified October 2023 on device interrogation  June 2024 SHIELA guided cardioversion and initiation of amiodarone.  EKG in office maintaining sinus rhythm first-degree AV block    Cardiac and Vasculature  August 2024 MPI no ischemia, no infarct    June 2024 SHIELA  LVEF 55-60%  Biatrial enlargement mild  MR mild/moderate  TR moderate/severe    Pacemaker  May 2023 implant [facility] [id number] dual-chamber permanent pacemaker    December 2024 device interrogation -set at VVI  Managed by Dr. Camargo    BMI 25.0-25.9,adult  Reviewed the merits of healthy lifestyle choices on overall cardiovascular health.      Plan:     Through informed decision making process incorporating patients unique circumstances, the following treatment plan will be initiated:    1.  Prescription drug management of cardiovascular medication for efficacy, adherence  to treatment, side effect assessment and polypharmacy. Current treatment clinically warranted and to continue without modifications.    2.  Amiodarone testing in 2 months (once leave SNF)    3. Return for follow-up; in the interim, contact the office if new symptoms arise.  Dr. Camargo in Sept 2025 as scheduled  Daisha POST in one year     Trinidad Bergeron MSN, APRN-CNP, PMHNP-[facility] [place], [place]     Please excuse any errors in grammar or translation related to this dictation. Voice recognition software was utilized to prepare this document.

## 2025-03-17 NOTE — PATIENT INSTRUCTIONS
Please bring all medicines, vitamins, and herbal supplements with you when you come to the office.    Prescriptions will not be filled unless you are compliant with your follow up appointments or have a follow up appointment scheduled as per instruction of your physician. Refills should be requested at the time of your visit.     Fall Prevention Education Given     EKG done in office today     PLAN:   Through informed decision making process incorporating patients unique circumstances, the following treatment plan will be initiated:    1.  Prescription drug management of cardiovascular medication for efficacy, adherence to treatment, side effect assessment and polypharmacy. Current treatment clinically warranted and to continue without modifications.    2.  Amiodarone testing in 2 months    3. Return for follow-up; in the interim, contact the office if new symptoms arise.  Dr. Camargo in Sept 2025 as scheduled  Daisha POST in one year         Ways to Help Prevent Falls at Home    Quick Tips   ? Ask for help if you need it. Most people want to help!   ? Get up slowly after sitting or laying down   ? Wear a medical alert device or keep cell phone in your pocket   ? Use night lights, especially areas near a bathroom   ? Keep the items you use often within reach on a small stool or end table   ? Use an assistive device such as walker or cane, as directed by provider/physical therapy   ? Use a non-slip mat and grab bars in your bathroom. Look for home health sections for best options     Other Areas to Focus On   ? Exercise and nutrition: Regular exercise or taking a falls prevention class are great ways improve strength and balance. Don’t forget to stay hydrated and bring a snack!   ? Medicine side effects: Some medicines can make you sleepy or dizzy, which could cause a fall. Ask your healthcare provider about the side effects your medicines could cause. Be sure to let them know if you take any vitamins or supplements as  well.   ? Tripping hazards: Remove items you could trip on, such as loose mats, rugs, cords, and clutter. Wear closed toe shoes with rubber soles.   ? Health and wellness: Get regular checkups with your healthcare provider, plus routine vision and hearing screenings. Talk with your healthcare provider about:   o Your medicines and the possible side effects - bring them in a bag if that is easier!   o Problems with balance or feeling dizzy   o Ways to promote bone health, such as Vitamin D and calcium supplements   o Questions or concerns about falling     *Ask your healthcare team if you have questions     ©[facility], 2022

## 2025-03-17 NOTE — LETTER
"March 18, 2025     Demian Robertson DO  [address]  [facility] [address]    Patient: Twyla Waller   YOB: 1950   Date of Visit: 3/17/2025       Dear Dr. Demian Robertson DO:    Thank you for referring Twyla Waller to me for evaluation. Below are my notes for this consultation.  If you have questions, please do not hesitate to call me. I look forward to following your patient along with you.       Sincerely,     Trinidad Bergeron, APRN-CNP      CC: No Recipients  ______________________________________________________________________________________    Chief Complaint  \" We are here for checkup\"    Reason for Visit  6-month follow-up  Patient presents to the office today for outpatient follow-up for A-fib, high risk med use, Watchman device, primary prevention.  Last evaluated in clinic by Dr. Camargo December 2024.  This is initial general cardiology follow-up.    Presents today in wheelchair for ease of transport.  Accompanied by spouse    Report a hospitalization for fall and flulike symptoms; no cardiology consultation.  She was discharged to a local SNF to complete comprehensive rehabilitation program approximately 10 days ago.    History of Present Illness   Very pleasant 75-year-old female with mild neurocognitive decline; unreliable historian.  She presents today in a wheelchair but is reportedly undergoing physical therapy at SNF.   reports that preceding hospitalization she just had progressive weakness at home with fairly frequent falls due to GI illness.  She complains of what sounds like orthostatic hypotension during physical therapy but no recurrent syncopal episodes.  Apparently amlodipine is a new medication.  Blood pressure in the office today is optimal.  There is no orthopnea or PND.  Daily activity less than 4 METS.    Daily activity:    < 4 METs  Denies any change in exercise capacity or functional tolerance since last office visit.    Overall,  appears to be stable " "from a cardiovascular standpoint. No anginal symptoms or left sided heart failure.   reports 'always on water pill\" (MR mild/moderate, LE edema) -recent labs without evidence of azotemia.    Review of Systems   Unable to perform ROS: Dementia   Cardiovascular:  Negative for chest pain, dyspnea on exertion, irregular heartbeat, leg swelling, near-syncope, orthopnea, palpitations, paroxysmal nocturnal dyspnea and syncope.        Visit Vitals  /76 (BP Location: Right arm, Patient Position: Sitting)   Pulse 64   Ht 1.626 m (5' 4\")   BMI 25.92 kg/m²   Smoking Status Never   BSA 1.76 m²     Physical Exam  Vitals and nursing note reviewed.   HENT:      Head: Normocephalic.   Cardiovascular:      Rate and Rhythm: Normal rate and regular rhythm.      Heart sounds: Murmur heard.      Systolic murmur is present with a grade of 1/6.      Comments: Trace ankle edema  Pulmonary:      Effort: Pulmonary effort is normal.      Breath sounds: Normal breath sounds.   Abdominal:      Palpations: Abdomen is soft.   Musculoskeletal:      Right lower leg: No edema.      Left lower leg: No edema.   Skin:     General: Skin is warm and dry.   Neurological:      General: No focal deficit present.      Mental Status: She is alert.   Psychiatric:         Mood and Affect: Mood normal.         Behavior: Behavior normal.        Allergies   Allergen Reactions   • Morphine Other     Mental Status Change   • Verapamil Unknown   • Calcium Channel Blocking Agents-Dihydropyridines Other     irregular heart rate   • Latex Itching and Rash   • Penicillins Rash   • Ace Inhibitors Cough   • Alendronate Unknown   • Calcium Channel Blocking Agent Diltiazem Analogues Itching   • Ibuprofen Unknown   • Iodinated Contrast Media Unknown     Per patient. Chart only had iodine listed, not iodinated contrast   • Lisinopril Cough   • Metformin Diarrhea   • Sertraline Unknown   • Trulicity [Dulaglutide] Diarrhea   • Iodine Rash     Current Outpatient " Medications   Medication Instructions   • acetaminophen (TYLENOL) 1,000 mg, Nightly   • amiodarone (PACERONE) 200 mg, oral, Daily   • amLODIPine (NORVASC) 5 mg, Daily   • ascorbic acid (VITAMIN C) 500 mg, Daily   • aspirin 81 mg, oral, Daily   • atorvastatin (LIPITOR) 20 mg, Nightly   • cholecalciferol (VITAMIN D-3) 2,000 Units, Daily RT   • citalopram (CELEXA) 40 mg, Every morning   • clopidogrel (PLAVIX) 75 mg, oral, Daily   • cyanocobalamin (Vitamin B-12) 1,000 mcg tablet 1 tablet, Daily   • folic acid (Folvite) 400 mcg tablet Daily   • furosemide (LASIX) 40 mg, Daily   • glucosamine-chondroitin 500-400 mg tablet 1 tablet, Daily   • insulin lispro (HUMALOG) 8 Units, 3 times daily before meals   • Lantus Solostar U-100 Insulin 22 Units, Every morning   • metoprolol tartrate (LOPRESSOR) 12.5 mg, oral, 2 times daily   • mirtazapine (REMERON) 15 mg, Nightly   • ondansetron (ZOFRAN) 4 mg, Every 8 hours PRN   • oxybutynin (DITROPAN) 5 mg, 2 times daily   • polyethylene glycol (GLYCOLAX, MIRALAX) 17 g, Daily   • potassium chloride CR 10 mEq ER tablet 10 mEq, 2 times daily   • risperiDONE (RISPERDAL) 0.5 mg, Nightly   • Rybelsus 14 mg, Daily   • sennosides (Senokot) 8.6 mg tablet 1 tablet, Daily      Assessment:    High risk medication use  Amiodarone start date July 2024  EKG in office sinus rhythm first-degree AV block, QTc 493.  Will make arrangements for amiodarone surveillance testing  March 2025 AST 71, ALT 74 (2 weeks prior 24 & 27)    Benign essential hypertension  optimal in office      Hyperlipidemia  Moderate intensity statin    Presence of Watchman left atrial appendage closure device  October 17, 2024:  #24mm Watchman LAAO device  CHADS VASc 4  Frequent falls  Remains on DAPT    Atrial fibrillation (Multi)  Identified October 2023 on device interrogation  June 2024 SHIELA guided cardioversion and initiation of amiodarone.  EKG in office maintaining sinus rhythm first-degree AV block    Cardiac and  Vasculature  August 2024 MPI no ischemia, no infarct    June 2024 SHIELA  LVEF 55-60%  Biatrial enlargement mild  MR mild/moderate  TR moderate/severe    Pacemaker  May 2023 implant M [id number] dual-chamber permanent pacemaker    December 2024 device interrogation -set at [facility]  Managed by Dr. Camarog    BMI 25.0-25.9,adult  Reviewed the merits of healthy lifestyle choices on overall cardiovascular health.      Plan:     Through informed decision making process incorporating patients unique circumstances, the following treatment plan will be initiated:    1.  Prescription drug management of cardiovascular medication for efficacy, adherence to treatment, side effect assessment and polypharmacy. Current treatment clinically warranted and to continue without modifications.    2.  Amiodarone testing in 2 months (once leave SNF)    3. Return for follow-up; in the interim, contact the office if new symptoms arise.  Dr. Camargo in Sept 2025 as scheduled  Daisha POST in one year     Trinidad Bergeron MSN, APRN-CNP, PMHNP-BC  [facility] [place]  [place], [place]     Please excuse any errors in grammar or translation related to this dictation. Voice recognition software was utilized to prepare this document.

## 2025-03-18 PROBLEM — I13.0 HYPERTENSIVE HEART AND CHRONIC KIDNEY DISEASE WITH HEART FAILURE AND STAGE 1 THROUGH STAGE 4 CHRONIC KIDNEY DISEASE, OR UNSPECIFIED CHRONIC KIDNEY DISEASE: Status: RESOLVED | Noted: 2023-06-14 | Resolved: 2025-03-18

## 2025-03-18 ASSESSMENT — ENCOUNTER SYMPTOMS
ORTHOPNEA: 0
SYNCOPE: 0
IRREGULAR HEARTBEAT: 0
PND: 0
NEAR-SYNCOPE: 0
PALPITATIONS: 0
DYSPNEA ON EXERTION: 0

## 2025-03-18 NOTE — ASSESSMENT & PLAN NOTE
May 2023 implant [facility] [id number] dual-chamber permanent pacemaker    December 2024 device interrogation -set at [facility]  Managed by Dr. Camargo

## 2025-03-18 NOTE — ASSESSMENT & PLAN NOTE
August 2024 MPI no ischemia, no infarct    June 2024 SHIELA  LVEF 55-60%  Biatrial enlargement mild  MR mild/moderate  TR moderate/severe

## 2025-03-18 NOTE — ASSESSMENT & PLAN NOTE
Amiodarone start date July 2024  EKG in office sinus rhythm first-degree AV block, QTc 493.  Will make arrangements for amiodarone surveillance testing  March 2025 AST 71, ALT 74 (2 weeks prior 24 & 27)

## 2025-03-18 NOTE — ASSESSMENT & PLAN NOTE
Identified October 2023 on device interrogation  June 2024 SHIELA guided cardioversion and initiation of amiodarone.  EKG in office maintaining sinus rhythm first-degree AV block

## 2025-04-07 ENCOUNTER — HOSPITAL ENCOUNTER (OUTPATIENT)
Dept: CARDIOLOGY | Facility: HOSPITAL | Age: 75
Discharge: HOME | End: 2025-04-07
Payer: MEDICARE

## 2025-04-07 DIAGNOSIS — I44.2 ATRIOVENTRICULAR BLOCK, COMPLETE (MULTI): ICD-10-CM

## 2025-04-07 DIAGNOSIS — Z95.0 CARDIAC PACEMAKER IN SITU: ICD-10-CM

## 2025-04-07 PROCEDURE — 93294 REM INTERROG EVL PM/LDLS PM: CPT | Performed by: INTERNAL MEDICINE

## 2025-04-07 PROCEDURE — 93296 REM INTERROG EVL PM/IDS: CPT

## 2025-05-06 ENCOUNTER — HOSPITAL ENCOUNTER (OUTPATIENT)
Dept: RADIOLOGY | Facility: CLINIC | Age: 75
End: 2025-05-06
Payer: MEDICARE

## 2025-05-29 ENCOUNTER — APPOINTMENT (OUTPATIENT)
Dept: RADIOLOGY | Facility: CLINIC | Age: 75
End: 2025-05-29
Payer: MEDICARE

## 2025-05-29 ENCOUNTER — APPOINTMENT (OUTPATIENT)
Dept: RADIOLOGY | Facility: HOSPITAL | Age: 75
End: 2025-05-29
Payer: MEDICARE

## 2025-09-05 ENCOUNTER — APPOINTMENT (OUTPATIENT)
Dept: CARDIOLOGY | Facility: CLINIC | Age: 75
End: 2025-09-05
Payer: MEDICARE

## 2025-09-05 ENCOUNTER — APPOINTMENT (OUTPATIENT)
Dept: CARDIOLOGY | Facility: HOSPITAL | Age: 75
End: 2025-09-05
Payer: MEDICARE

## 2026-04-20 ENCOUNTER — APPOINTMENT (OUTPATIENT)
Dept: CARDIOLOGY | Facility: CLINIC | Age: 76
End: 2026-04-20
Payer: MEDICARE

## (undated) DEVICE — SHEATH, PINNACLE, W/O GUIDEWIRE, 8FR INTRODUCER,  8FR DIA, 2.5 CM DILATOR

## (undated) DEVICE — CLOSURE SYSTEM, VASCULAR, MVP 6-12FR, VENOUS

## (undated) DEVICE — CATHETER, ACUSON ACUNAV ULTRASOUND, 8FR 90CM

## (undated) DEVICE — ACCESS KIT, S-MAK MINI, 4FR 10CM 0.018IN 40CM, NT/PT, ECHO ENHANCE NEEDLE

## (undated) DEVICE — SYSTEM, ACCESS, FXD DBL CURVE, WATCHMAN

## (undated) DEVICE — SHEATH, PINNACLE, 10 CM,  8FR INTRODUCER, 8FR DIA, 2.5 CM DIALATOR

## (undated) DEVICE — SHEATH, PINNACLE, 10 CM,  7FR INTRODUCER, 7FR DIA, 2.5 CM DIALATOR

## (undated) DEVICE — CATHETER, DIAGNOSTIC, DXTERITY, 6FR PIG145, 110CM, 6 SH

## (undated) DEVICE — PAD, ELECTRODE DEFIB PADPRO ADULT STRL W/ADAPTER

## (undated) DEVICE — VERSACROSS KIT, ACCESS SOLUTION, RF WIRE 180CM

## (undated) DEVICE — GUIDEWIRE, INQWIRE, 3MM J, .035, 150